# Patient Record
Sex: FEMALE | Race: WHITE | Employment: FULL TIME | URBAN - METROPOLITAN AREA
[De-identification: names, ages, dates, MRNs, and addresses within clinical notes are randomized per-mention and may not be internally consistent; named-entity substitution may affect disease eponyms.]

---

## 2017-01-04 ENCOUNTER — PATIENT MESSAGE (OUTPATIENT)
Dept: INTERNAL MEDICINE CLINIC | Facility: CLINIC | Age: 52
End: 2017-01-04

## 2017-01-05 ENCOUNTER — TELEPHONE (OUTPATIENT)
Dept: INTERNAL MEDICINE CLINIC | Facility: CLINIC | Age: 52
End: 2017-01-05

## 2017-01-05 DIAGNOSIS — E89.0 POSTOPERATIVE HYPOTHYROIDISM: ICD-10-CM

## 2017-01-05 DIAGNOSIS — E78.00 PURE HYPERCHOLESTEROLEMIA: Primary | ICD-10-CM

## 2017-01-05 RX ORDER — ATORVASTATIN CALCIUM 10 MG/1
TABLET, FILM COATED ORAL
Qty: 30 TABLET | Refills: 0 | Status: SHIPPED | OUTPATIENT
Start: 2017-01-05 | End: 2017-02-06

## 2017-01-06 ENCOUNTER — PATIENT MESSAGE (OUTPATIENT)
Dept: INTERNAL MEDICINE CLINIC | Facility: CLINIC | Age: 52
End: 2017-01-06

## 2017-01-06 NOTE — TELEPHONE ENCOUNTER
From: Fbai Goldberg  To: Katheryn Bunn MD  Sent: 1/4/2017 11:24 AM CST  Subject: Other    [id number]. Dhaval Bui Doctor, Can you please call in a prescription for my cold sore also I will be coming to see you in the month of Febru

## 2017-01-06 NOTE — TELEPHONE ENCOUNTER
Please call patient I refilled her cholesterol medicine for 1 month, she is due for blood test to check liver function test, and thyroid had to be rechecked, I will place order, if she wants to go somewhere else please mail he wonders if needed

## 2017-01-11 RX ORDER — DOXEPIN HYDROCHLORIDE 25 MG/1
CAPSULE ORAL
Qty: 30 CAPSULE | Refills: 5 | Status: CANCELLED | OUTPATIENT
Start: 2017-01-11

## 2017-01-11 NOTE — TELEPHONE ENCOUNTER
please advise as does not meet RN protocol for refill criteria- LOV 6/6/16  No appt pending          Refill Protocol Appointment Criteria  · Appointment scheduled in the past 6 months or in the next 3 months  Recent Visits       Provider Department [facility]

## 2017-01-11 NOTE — TELEPHONE ENCOUNTER
From: Sanna Sanders  To: Concha Vasquez MD  Sent: 1/7/2017 11:04 PM CST  Subject: Medication Renewal Request    Original authorizing provider: MD Sanna Ayala would like a refill of the following medications:  Doxepin HCl 25 MG Oral Cap [Nine

## 2017-01-12 RX ORDER — DOXEPIN HYDROCHLORIDE 25 MG/1
CAPSULE ORAL
Qty: 30 CAPSULE | Refills: 1 | OUTPATIENT
Start: 2017-01-12 | End: 2017-03-30

## 2017-01-16 ENCOUNTER — TELEPHONE (OUTPATIENT)
Dept: INTERNAL MEDICINE CLINIC | Facility: CLINIC | Age: 52
End: 2017-01-16

## 2017-01-16 DIAGNOSIS — Z00.00 PHYSICAL EXAM: Primary | ICD-10-CM

## 2017-01-16 DIAGNOSIS — E55.9 VITAMIN D DEFICIENCY: ICD-10-CM

## 2017-01-16 NOTE — TELEPHONE ENCOUNTER
Patient is coming in for her annual physical and would like to know if Dr Mahamed Covarrubias is interested in ordering any additional labs. She plans to have her fasting labs performed prior to this appt to discuss her results.  Call patient to discuss further so she k

## 2017-01-16 NOTE — TELEPHONE ENCOUNTER
rx for doxepin called in to pharmacy. Called pt. Left message to call back and make a f/u appointment.   [phone] (home)

## 2017-01-19 NOTE — TELEPHONE ENCOUNTER
Called pt. Left message requested test ordered by Dr. Irasema Hamilton . Pt. Can call back with questions.

## 2017-01-31 ENCOUNTER — OFFICE VISIT (OUTPATIENT)
Dept: INTERNAL MEDICINE CLINIC | Facility: CLINIC | Age: 52
End: 2017-01-31

## 2017-01-31 ENCOUNTER — LAB ENCOUNTER (OUTPATIENT)
Dept: LAB | Age: 52
End: 2017-01-31
Attending: INTERNAL MEDICINE
Payer: COMMERCIAL

## 2017-01-31 VITALS
TEMPERATURE: 98 F | HEART RATE: 70 BPM | HEIGHT: 66 IN | WEIGHT: 139 LBS | RESPIRATION RATE: 18 BRPM | SYSTOLIC BLOOD PRESSURE: 115 MMHG | DIASTOLIC BLOOD PRESSURE: 78 MMHG | BODY MASS INDEX: 22.34 KG/M2

## 2017-01-31 DIAGNOSIS — E55.9 VITAMIN D DEFICIENCY: ICD-10-CM

## 2017-01-31 DIAGNOSIS — Z12.39 BREAST CANCER SCREENING: ICD-10-CM

## 2017-01-31 DIAGNOSIS — F41.9 ANXIETY: ICD-10-CM

## 2017-01-31 DIAGNOSIS — E89.0 POSTOPERATIVE HYPOTHYROIDISM: ICD-10-CM

## 2017-01-31 DIAGNOSIS — R92.2 DENSE BREAST: ICD-10-CM

## 2017-01-31 DIAGNOSIS — E78.00 PURE HYPERCHOLESTEROLEMIA: ICD-10-CM

## 2017-01-31 DIAGNOSIS — Z00.00 PHYSICAL EXAM: ICD-10-CM

## 2017-01-31 DIAGNOSIS — Z00.00 PHYSICAL EXAM, ROUTINE: Primary | ICD-10-CM

## 2017-01-31 PROBLEM — R92.30 DENSE BREAST: Status: ACTIVE | Noted: 2017-01-31

## 2017-01-31 LAB
ALBUMIN SERPL BCP-MCNC: 4.1 G/DL (ref 3.5–4.8)
ALBUMIN/GLOB SERPL: 1.3 {RATIO} (ref 1–2)
ALP SERPL-CCNC: 56 U/L (ref 32–100)
ALT SERPL-CCNC: 22 U/L (ref 14–54)
ANION GAP SERPL CALC-SCNC: 7 MMOL/L (ref 0–18)
AST SERPL-CCNC: 24 U/L (ref 15–41)
BASOPHILS # BLD: 0.1 K/UL (ref 0–0.2)
BASOPHILS NFR BLD: 1 %
BILIRUB DIRECT SERPL-MCNC: 0.1 MG/DL (ref 0–0.2)
BILIRUB SERPL-MCNC: 0.6 MG/DL (ref 0.3–1.2)
BUN SERPL-MCNC: 12 MG/DL (ref 8–20)
BUN/CREAT SERPL: 18.8 (ref 10–20)
CALCIUM SERPL-MCNC: 9.4 MG/DL (ref 8.5–10.5)
CHLORIDE SERPL-SCNC: 102 MMOL/L (ref 95–110)
CHOLEST SERPL-MCNC: 166 MG/DL (ref 110–200)
CO2 SERPL-SCNC: 32 MMOL/L (ref 22–32)
CREAT SERPL-MCNC: 0.64 MG/DL (ref 0.5–1.5)
EOSINOPHIL # BLD: 0.2 K/UL (ref 0–0.7)
EOSINOPHIL NFR BLD: 4 %
ERYTHROCYTE [DISTWIDTH] IN BLOOD BY AUTOMATED COUNT: 13.4 % (ref 11–15)
GLOBULIN PLAS-MCNC: 3.2 G/DL (ref 2.5–3.7)
GLUCOSE SERPL-MCNC: 100 MG/DL (ref 70–99)
HCT VFR BLD AUTO: 37.7 % (ref 35–48)
HDLC SERPL-MCNC: 39 MG/DL
HGB BLD-MCNC: 12.2 G/DL (ref 12–16)
LDLC SERPL CALC-MCNC: 103 MG/DL (ref 0–99)
LYMPHOCYTES # BLD: 1.6 K/UL (ref 1–4)
LYMPHOCYTES NFR BLD: 31 %
MCH RBC QN AUTO: 26.6 PG (ref 27–32)
MCHC RBC AUTO-ENTMCNC: 32.5 G/DL (ref 32–37)
MCV RBC AUTO: 82 FL (ref 80–100)
MONOCYTES # BLD: 0.4 K/UL (ref 0–1)
MONOCYTES NFR BLD: 8 %
NEUTROPHILS # BLD AUTO: 2.8 K/UL (ref 1.8–7.7)
NEUTROPHILS NFR BLD: 55 %
NONHDLC SERPL-MCNC: 127 MG/DL
OSMOLALITY UR CALC.SUM OF ELEC: 292 MOSM/KG (ref 275–295)
PLATELET # BLD AUTO: 192 K/UL (ref 140–400)
PMV BLD AUTO: 9.6 FL (ref 7.4–10.3)
POTASSIUM SERPL-SCNC: 4.5 MMOL/L (ref 3.3–5.1)
PROT SERPL-MCNC: 7.3 G/DL (ref 5.9–8.4)
RBC # BLD AUTO: 4.59 M/UL (ref 3.7–5.4)
SODIUM SERPL-SCNC: 141 MMOL/L (ref 136–144)
TRIGL SERPL-MCNC: 118 MG/DL (ref 1–149)
TSH SERPL-ACNC: 0.4 UIU/ML (ref 0.34–5.6)
WBC # BLD AUTO: 5.1 K/UL (ref 4–11)

## 2017-01-31 PROCEDURE — 84443 ASSAY THYROID STIM HORMONE: CPT

## 2017-01-31 PROCEDURE — 99396 PREV VISIT EST AGE 40-64: CPT | Performed by: INTERNAL MEDICINE

## 2017-01-31 PROCEDURE — 36415 COLL VENOUS BLD VENIPUNCTURE: CPT

## 2017-01-31 PROCEDURE — 80053 COMPREHEN METABOLIC PANEL: CPT

## 2017-01-31 PROCEDURE — 85025 COMPLETE CBC W/AUTO DIFF WBC: CPT

## 2017-01-31 PROCEDURE — 80061 LIPID PANEL: CPT

## 2017-01-31 PROCEDURE — 82306 VITAMIN D 25 HYDROXY: CPT

## 2017-01-31 PROCEDURE — 82248 BILIRUBIN DIRECT: CPT

## 2017-01-31 NOTE — PROGRESS NOTES
HPI:    Patient ID: Sarmad Zuniga is a 45year old female presents for physical exam.    HPI  Patient reports that overall she has been doing well, continues to be bothered by menopausal symptoms getting 5 7 flashes throughout the day, mainly happened at nig 115/78 mmHg  Pulse 70  Temp(Src) 97.7 °F (36.5 °C) (Oral)  Resp 18  Ht 5' 6\" (1.676 m)  Wt 139 lb (63.05 kg)  BMI 22.45 kg/m2   Physical Exam    Constitutional: She is oriented to person, place, and time. She appears well-developed and well-nourished.  No hypothyroidism check TSH and adjust medication if needed    No orders of the defined types were placed in this encounter.        Meds This Visit:  No prescriptions requested or ordered in this encounter    Imaging & Referrals:  [facility] DAYDAY 2D+3D SCREENING BILA

## 2017-02-01 LAB — 25(OH)D3 SERPL-MCNC: 29.1 NG/ML

## 2017-02-06 RX ORDER — ATORVASTATIN CALCIUM 10 MG/1
TABLET, FILM COATED ORAL
Qty: 30 TABLET | Refills: 6 | Status: SHIPPED | OUTPATIENT
Start: 2017-02-06 | End: 2017-09-24

## 2017-03-06 RX ORDER — NADOLOL 20 MG/1
TABLET ORAL
Qty: 30 TABLET | Refills: 11 | Status: SHIPPED | OUTPATIENT
Start: 2017-03-06 | End: 2018-03-02

## 2017-03-06 RX ORDER — LEVOTHYROXINE SODIUM 0.12 MG/1
TABLET ORAL
Qty: 30 TABLET | Refills: 10 | Status: SHIPPED | OUTPATIENT
Start: 2017-03-06 | End: 2018-02-03

## 2017-03-17 ENCOUNTER — APPOINTMENT (OUTPATIENT)
Dept: GENERAL RADIOLOGY | Facility: HOSPITAL | Age: 52
End: 2017-03-17
Attending: STUDENT IN AN ORGANIZED HEALTH CARE EDUCATION/TRAINING PROGRAM
Payer: COMMERCIAL

## 2017-03-17 ENCOUNTER — HOSPITAL ENCOUNTER (EMERGENCY)
Facility: HOSPITAL | Age: 52
Discharge: HOME | End: 2017-03-17
Attending: STUDENT IN AN ORGANIZED HEALTH CARE EDUCATION/TRAINING PROGRAM
Payer: COMMERCIAL

## 2017-03-17 VITALS
BODY MASS INDEX: 22.5 KG/M2 | OXYGEN SATURATION: 100 % | RESPIRATION RATE: 15 BRPM | DIASTOLIC BLOOD PRESSURE: 82 MMHG | HEIGHT: 66 IN | WEIGHT: 140 LBS | HEART RATE: 68 BPM | SYSTOLIC BLOOD PRESSURE: 145 MMHG | TEMPERATURE: 99 F

## 2017-03-17 DIAGNOSIS — R07.9 ACUTE CHEST PAIN: Primary | ICD-10-CM

## 2017-03-17 LAB
ALBUMIN SERPL-MCNC: 4.4 G/DL (ref 3.5–4.8)
ALP LIVER SERPL-CCNC: 67 U/L (ref 41–108)
ALT SERPL-CCNC: 32 U/L (ref 14–54)
APTT PPP: 26.4 SECONDS (ref 25–34)
AST SERPL-CCNC: 27 U/L (ref 15–41)
BASOPHILS # BLD AUTO: 0.06 X10(3) UL (ref 0–0.1)
BASOPHILS NFR BLD AUTO: 0.9 %
BILIRUB SERPL-MCNC: 0.3 MG/DL (ref 0.1–2)
BUN BLD-MCNC: 16 MG/DL (ref 8–20)
CALCIUM BLD-MCNC: 9.7 MG/DL (ref 8.3–10.3)
CHLORIDE: 102 MMOL/L (ref 101–111)
CO2: 33 MMOL/L (ref 22–32)
CREAT BLD-MCNC: 0.77 MG/DL (ref 0.55–1.02)
EOSINOPHIL # BLD AUTO: 0.15 X10(3) UL (ref 0–0.3)
EOSINOPHIL NFR BLD AUTO: 2.2 %
ERYTHROCYTE [DISTWIDTH] IN BLOOD BY AUTOMATED COUNT: 13 % (ref 11.5–16)
GLUCOSE BLD-MCNC: 118 MG/DL (ref 70–99)
HCT VFR BLD AUTO: 37.9 % (ref 34–50)
HGB BLD-MCNC: 12.4 G/DL (ref 12–16)
IMMATURE GRANULOCYTE COUNT: 0.01 X10(3) UL (ref 0–1)
IMMATURE GRANULOCYTE RATIO %: 0.1 %
INR BLD: 0.95 (ref 0.89–1.11)
LYMPHOCYTES # BLD AUTO: 2.08 X10(3) UL (ref 0.9–4)
LYMPHOCYTES NFR BLD AUTO: 30.4 %
M PROTEIN MFR SERPL ELPH: 8.1 G/DL (ref 6.1–8.3)
MCH RBC QN AUTO: 27.2 PG (ref 27–33.2)
MCHC RBC AUTO-ENTMCNC: 32.7 G/DL (ref 31–37)
MCV RBC AUTO: 83.1 FL (ref 81–100)
MONOCYTES # BLD AUTO: 0.63 X10(3) UL (ref 0.1–0.6)
MONOCYTES NFR BLD AUTO: 9.2 %
NEUTROPHIL ABS PRELIM: 3.91 X10 (3) UL (ref 1.3–6.7)
NEUTROPHILS # BLD AUTO: 3.91 X10(3) UL (ref 1.3–6.7)
NEUTROPHILS NFR BLD AUTO: 57.2 %
PLATELET # BLD AUTO: 191 10(3)UL (ref 150–450)
POTASSIUM SERPL-SCNC: 3.9 MMOL/L (ref 3.6–5.1)
PRO-BETA NATRIURETIC PEPTIDE: 122 PG/ML (ref ?–125)
PSA SERPL DL<=0.01 NG/ML-MCNC: 12.7 SECONDS (ref 12–14.3)
RBC # BLD AUTO: 4.56 X10(6)UL (ref 3.8–5.1)
RED CELL DISTRIBUTION WIDTH-SD: 39.2 FL (ref 35.1–46.3)
SODIUM SERPL-SCNC: 139 MMOL/L (ref 136–144)
TROPONIN: <0.046 NG/ML (ref ?–0.05)
WBC # BLD AUTO: 6.8 X10(3) UL (ref 4–13)

## 2017-03-17 PROCEDURE — 99284 EMERGENCY DEPT VISIT MOD MDM: CPT

## 2017-03-17 PROCEDURE — 93010 ELECTROCARDIOGRAM REPORT: CPT

## 2017-03-17 PROCEDURE — 71010 XR CHEST AP PORTABLE  (CPT=71010): CPT

## 2017-03-17 PROCEDURE — 85610 PROTHROMBIN TIME: CPT | Performed by: STUDENT IN AN ORGANIZED HEALTH CARE EDUCATION/TRAINING PROGRAM

## 2017-03-17 PROCEDURE — 99285 EMERGENCY DEPT VISIT HI MDM: CPT

## 2017-03-17 PROCEDURE — 85730 THROMBOPLASTIN TIME PARTIAL: CPT | Performed by: STUDENT IN AN ORGANIZED HEALTH CARE EDUCATION/TRAINING PROGRAM

## 2017-03-17 PROCEDURE — 93005 ELECTROCARDIOGRAM TRACING: CPT

## 2017-03-17 PROCEDURE — 96360 HYDRATION IV INFUSION INIT: CPT

## 2017-03-17 PROCEDURE — 85025 COMPLETE CBC W/AUTO DIFF WBC: CPT | Performed by: STUDENT IN AN ORGANIZED HEALTH CARE EDUCATION/TRAINING PROGRAM

## 2017-03-17 PROCEDURE — 83880 ASSAY OF NATRIURETIC PEPTIDE: CPT | Performed by: STUDENT IN AN ORGANIZED HEALTH CARE EDUCATION/TRAINING PROGRAM

## 2017-03-17 PROCEDURE — 84484 ASSAY OF TROPONIN QUANT: CPT | Performed by: STUDENT IN AN ORGANIZED HEALTH CARE EDUCATION/TRAINING PROGRAM

## 2017-03-17 PROCEDURE — 80053 COMPREHEN METABOLIC PANEL: CPT | Performed by: STUDENT IN AN ORGANIZED HEALTH CARE EDUCATION/TRAINING PROGRAM

## 2017-03-17 RX ORDER — ASPIRIN 81 MG/1
324 TABLET, CHEWABLE ORAL ONCE
Status: COMPLETED | OUTPATIENT
Start: 2017-03-17 | End: 2017-03-17

## 2017-03-18 ENCOUNTER — TELEPHONE (OUTPATIENT)
Dept: INTERNAL MEDICINE CLINIC | Facility: CLINIC | Age: 52
End: 2017-03-18

## 2017-03-18 ENCOUNTER — APPOINTMENT (OUTPATIENT)
Dept: CV DIAGNOSTICS | Facility: HOSPITAL | Age: 52
End: 2017-03-18
Attending: EMERGENCY MEDICINE
Payer: COMMERCIAL

## 2017-03-18 ENCOUNTER — HOSPITAL ENCOUNTER (EMERGENCY)
Facility: HOSPITAL | Age: 52
Discharge: HOME | End: 2017-03-18
Attending: EMERGENCY MEDICINE
Payer: COMMERCIAL

## 2017-03-18 VITALS
RESPIRATION RATE: 18 BRPM | WEIGHT: 140 LBS | BODY MASS INDEX: 22.5 KG/M2 | SYSTOLIC BLOOD PRESSURE: 130 MMHG | OXYGEN SATURATION: 100 % | DIASTOLIC BLOOD PRESSURE: 85 MMHG | HEART RATE: 66 BPM | TEMPERATURE: 98 F | HEIGHT: 66 IN

## 2017-03-18 DIAGNOSIS — R07.89 CHEST PAIN, ATYPICAL: Primary | ICD-10-CM

## 2017-03-18 LAB
ATRIAL RATE: 65 BPM
ATRIAL RATE: 68 BPM
P AXIS: 73 DEGREES
P AXIS: 74 DEGREES
P-R INTERVAL: 166 MS
P-R INTERVAL: 194 MS
Q-T INTERVAL: 390 MS
Q-T INTERVAL: 394 MS
QRS DURATION: 72 MS
QRS DURATION: 74 MS
QTC CALCULATION (BEZET): 405 MS
QTC CALCULATION (BEZET): 418 MS
R AXIS: 38 DEGREES
R AXIS: 41 DEGREES
T AXIS: 58 DEGREES
T AXIS: 59 DEGREES
TROPONIN: <0.046 NG/ML (ref ?–0.05)
VENTRICULAR RATE: 65 BPM
VENTRICULAR RATE: 68 BPM

## 2017-03-18 PROCEDURE — 93018 CV STRESS TEST I&R ONLY: CPT | Performed by: INTERNAL MEDICINE

## 2017-03-18 PROCEDURE — 96374 THER/PROPH/DIAG INJ IV PUSH: CPT

## 2017-03-18 PROCEDURE — 93350 STRESS TTE ONLY: CPT

## 2017-03-18 PROCEDURE — 93017 CV STRESS TEST TRACING ONLY: CPT

## 2017-03-18 PROCEDURE — 93350 STRESS TTE ONLY: CPT | Performed by: INTERNAL MEDICINE

## 2017-03-18 PROCEDURE — 84484 ASSAY OF TROPONIN QUANT: CPT | Performed by: EMERGENCY MEDICINE

## 2017-03-18 PROCEDURE — 99285 EMERGENCY DEPT VISIT HI MDM: CPT

## 2017-03-18 PROCEDURE — 93010 ELECTROCARDIOGRAM REPORT: CPT

## 2017-03-18 PROCEDURE — 93005 ELECTROCARDIOGRAM TRACING: CPT

## 2017-03-18 RX ORDER — KETOROLAC TROMETHAMINE 30 MG/ML
30 INJECTION, SOLUTION INTRAMUSCULAR; INTRAVENOUS ONCE
Status: COMPLETED | OUTPATIENT
Start: 2017-03-18 | End: 2017-03-18

## 2017-03-18 NOTE — ED PROVIDER NOTES
Signed out by Dr. Nikki Sepulveda to f/u stress echo result. Dr. Calista Galindo did call and relay stress echo appears negative. Pt remains aysmptomatic. Will discharge home as planned.

## 2017-03-18 NOTE — ED INITIAL ASSESSMENT (HPI)
Arrives with c/o midsternal chest pain that began at 1330 today while at work. States thought it was heartburn. Eventually took two antacids without relief.   When was punching out of work at [employer], was checked by the nurse who took her blood press

## 2017-03-18 NOTE — ED INITIAL ASSESSMENT (HPI)
Pt c/o intermittent chest pain that started yesterday at 1330, Pt was seen in ER last night for evaluation of chest pain, Pt rpt feeling improvement last night and was D/C home.  However, Pt awoke this AM w/ return of intermittent chest pain that comes on f

## 2017-03-18 NOTE — ED PROVIDER NOTES
Patient Seen in: [facility] Emergency Department    History   Patient presents with:  Chest Pain Angina (cardiovascular)    Stated Complaint: Chest Pain    HPI    Patient is a 79-year-old woman presents for reevaluation.   Patient had intermittent ch stated in HPI.     Physical Exam       ED Triage Vitals   BP 03/18/17 1152 162/98 mmHg   Pulse 03/18/17 1152 68   Resp 03/18/17 1152 18   Temp 03/18/17 1152 98.3 °F (36.8 °C)   Temp src 03/18/17 1152 Temporal   SpO2 03/18/17 1152 100 %   O2 Device 03/18/17 file for this visit. Follow-up:  No follow-up provider specified.     Medications Prescribed:  Current Discharge Medication List

## 2017-03-18 NOTE — ED NOTES
PT return from Stress Test in no acute distress, A&Ox4, skin parameters in tact, breathing w/o difficulty, NSR on cardiac monitor, denies pain,  at bedside

## 2017-03-18 NOTE — TELEPHONE ENCOUNTER
Pt called in stating she went to the ER yesterday for chest pains and was told to make a follow up appt with Dr. Rubén Teresa so she can get a stress test ordered. CSS was going to book the appt for an ER follow up, but pt states her chest is still sore today.

## 2017-03-18 NOTE — TELEPHONE ENCOUNTER
Actions Requested: Requesting order for stress test and calling as directed to do so per ER MD yesterday  Situation/Background   Problem: Chest pain   Onset: yesterday   Associated Symptoms: chest pain worsens with exertion/movement, denies any sob, nausea emergency department work-up was negative. Patient remained hemodynamically stable throughout their emergency department period of observation with no adverse events or symptoms reported by the patient.     Patient was given aspirin empirically in the e

## 2017-03-18 NOTE — ED PROVIDER NOTES
Patient Seen in: [facility] Emergency Department    History   Patient presents with:  Chest Pain Angina (cardiovascular)    Stated Complaint: chest pain    HPI    Patient is a 53-year-old female who presents emergency department complaining of centr 62         Smoking Status: Former Smoker                   Packs/Day: 0.00  Years:         Alcohol Use: No                Review of Systems    Positive for stated complaint: chest pain  Other systems are as noted in HPI.   Constitutional and vital signs rev 0.63 (*)     All other components within normal limits   TROPONIN I - Normal   PRO BETA NATRIURETIC PEPTIDE - Normal   PROTHROMBIN TIME (PT) - Normal   PTT, ACTIVATED - Normal    Narrative:      The aPTT Heparin Therapeutic Range is approximately 65- 104 se outpatient workup at this time and the patient prefers to follow-up on outpatient basis.     Given the fact that her symptoms do not coincide with exertion, the patient has no previous history of high cholesterol, high blood pressure or diabetes or smoking

## 2017-03-18 NOTE — TELEPHONE ENCOUNTER
Kelly Sandoval @[employer] 9:47 AM      Please see acute for Tanisha De Paz, they are calling back. thank you.

## 2017-03-20 NOTE — TELEPHONE ENCOUNTER
Patient was seen in the emergency room and had negative stress echocardiogram, she should see cardiologist if she continues to have chest pains, she is high risk because of her history taking cholesterol medication, blood pressure medication etc. please re

## 2017-03-30 RX ORDER — DOXEPIN HYDROCHLORIDE 25 MG/1
CAPSULE ORAL
Qty: 30 CAPSULE | Refills: 2 | Status: SHIPPED | OUTPATIENT
Start: 2017-03-30 | End: 2017-06-14

## 2017-03-30 NOTE — TELEPHONE ENCOUNTER
Pt calling to refill doxepin 25, she is out medication. Please advise. Current Outpatient Prescriptions:  Doxepin HCl 25 MG Oral Cap TAKE 1 CAPSULE BY MOUTH NIGHTLY.  Disp: 30 capsule Rfl: 1

## 2017-03-30 NOTE — TELEPHONE ENCOUNTER
Rx request for Doxepin HCL 25 mg, PASSED Psychiatric-Non-Scheduled (anti-anxiety) Protocol. Rx filled per protocol.     Refill Protocol Appointment Criteria  · Appointment scheduled in the past 6 months or in the next 3 months  Recent Visits       Provider

## 2017-04-05 ENCOUNTER — HOSPITAL ENCOUNTER (OUTPATIENT)
Dept: MAMMOGRAPHY | Age: 52
Discharge: HOME | End: 2017-04-05
Attending: INTERNAL MEDICINE
Payer: COMMERCIAL

## 2017-04-05 DIAGNOSIS — R92.2 DENSE BREAST: ICD-10-CM

## 2017-04-05 DIAGNOSIS — Z12.39 BREAST CANCER SCREENING: ICD-10-CM

## 2017-04-05 PROCEDURE — 77063 BREAST TOMOSYNTHESIS BI: CPT

## 2017-04-05 PROCEDURE — 77067 SCR MAMMO BI INCL CAD: CPT

## 2017-04-05 RX ORDER — ESCITALOPRAM OXALATE 10 MG/1
TABLET ORAL
Qty: 30 TABLET | Refills: 6 | Status: SHIPPED | OUTPATIENT
Start: 2017-04-05 | End: 2017-10-31

## 2017-06-14 RX ORDER — DOXEPIN HYDROCHLORIDE 25 MG/1
CAPSULE ORAL
Qty: 30 CAPSULE | Refills: 5 | Status: SHIPPED | OUTPATIENT
Start: 2017-06-14 | End: 2017-08-02

## 2017-06-14 NOTE — TELEPHONE ENCOUNTER
Pt is going out of town today and will need her medication. Didn't realize she was down to her last 2 pills until the last minute.

## 2017-06-14 NOTE — TELEPHONE ENCOUNTER
Please advise on refill request.     Refill Protocol Appointment Criteria  · Appointment scheduled in the past 6 months or in the next 3 months  Recent Visits       Provider Department Primary Dx    4 months ago Elodia Rosenbaum MD [facility], [facility], [address] [place] [address]

## 2017-08-01 ENCOUNTER — TELEPHONE (OUTPATIENT)
Dept: INTERNAL MEDICINE CLINIC | Facility: CLINIC | Age: 52
End: 2017-08-01

## 2017-08-01 NOTE — TELEPHONE ENCOUNTER
Current Outpatient Prescriptions:  DOXEPIN HCL 25 MG Oral Cap TAKE 1 CAPSULE BY MOUTH NIGHTLY.  Disp: 30 capsule Rfl: 5     Pt requesting refill- said she has been taking 2 daily-per Dr Claudio Serum instructions if needed to help her sleep  Asking for new rx

## 2017-08-02 ENCOUNTER — TELEPHONE (OUTPATIENT)
Dept: INTERNAL MEDICINE CLINIC | Facility: CLINIC | Age: 52
End: 2017-08-02

## 2017-08-02 DIAGNOSIS — E78.00 PURE HYPERCHOLESTEROLEMIA: ICD-10-CM

## 2017-08-02 DIAGNOSIS — E89.0 POSTOPERATIVE HYPOTHYROIDISM: Primary | ICD-10-CM

## 2017-08-02 NOTE — TELEPHONE ENCOUNTER
Patient states that she is running out of Rx sooner than intended due to Dr giving her permission to take 2 caps to help her sleep instead of just 1 daily.  Needs us to send revised script to pharmacy with adjusted dose and quantity which would allow her fl

## 2017-08-02 NOTE — TELEPHONE ENCOUNTER
Dr Debi Potts, please see pt message below. Nothing noted (updated) in med list that pt is to take 2 tabs instead of the one nightly. Please advise regarding pended refill with 2 capsule nightly dose.

## 2017-08-03 RX ORDER — DOXEPIN HYDROCHLORIDE 25 MG/1
50 CAPSULE ORAL NIGHTLY
Qty: 60 CAPSULE | Refills: 3 | Status: SHIPPED | OUTPATIENT
Start: 2017-08-03 | End: 2017-09-24

## 2017-08-15 ENCOUNTER — TELEPHONE (OUTPATIENT)
Dept: FAMILY MEDICINE CLINIC | Facility: CLINIC | Age: 52
End: 2017-08-15

## 2017-08-15 NOTE — TELEPHONE ENCOUNTER
Actions Requested: pt asking for prescription for antibiotic, pt already has appt scheduled Monday 8/21/17.   Problem: swelling under right eye near nose  Onset and Timing: symptoms started one week ago  Associated Symptoms: problem started when pt noted a rash with multiple small blisters grouped together (i.e., dermatomal distribution or \"band\" or \"stripe\")  * Swelling of both lower legs (i.e., bilateral pedal edema)  * Widespread rash on body  * Swelling is painful to touch  * Toothache  * Patient wan

## 2017-08-15 NOTE — TELEPHONE ENCOUNTER
Pt needs to be seen  , any doctor at the clinic is fine, she needs to be  evaluated , IC is option as well

## 2017-08-16 NOTE — TELEPHONE ENCOUNTER
Patient has choice to see eye specialist, I can see her tomorrow at 11:00, at [facility]   double book 11;40 if needed, my Friday schedule is open for her to be seen if you appointment to speed up evaluation

## 2017-08-16 NOTE — TELEPHONE ENCOUNTER
Patient stated she does not have the money to go to the IC and does not have the time to get off work,,   She stated she has a \"small knot\" and swelling in the corner of her right eye. She also stated \"it is more of a cosmetic thing. \"    Was asking fo

## 2017-08-17 NOTE — TELEPHONE ENCOUNTER
Pt states she shceduled an appt for Monday 8/21 which is the first opportunity she has to see the doctor. States she had a pimple beneath her eye which she squeezed and now it is red and inflamed.   States she has gotten abx in the past for skin and acne i

## 2017-08-17 NOTE — TELEPHONE ENCOUNTER
Spoke with patient who states that this is the situation, she states we misunderstood what she was informing us of. Under her eye on the side of her nose she had a white head that she squeezed.  She states it began to go down after a few days but she co

## 2017-08-17 NOTE — TELEPHONE ENCOUNTER
Pt  Needs to be seen any doctor at the clinic or  IC is  impotant I   Do not feel comfortable treating her bu phone , face  Cellulitis  Can be serious, she needs to be seen

## 2017-08-21 ENCOUNTER — LAB ENCOUNTER (OUTPATIENT)
Dept: LAB | Age: 52
End: 2017-08-21
Attending: INTERNAL MEDICINE
Payer: COMMERCIAL

## 2017-08-21 ENCOUNTER — OFFICE VISIT (OUTPATIENT)
Dept: INTERNAL MEDICINE CLINIC | Facility: CLINIC | Age: 52
End: 2017-08-21

## 2017-08-21 ENCOUNTER — OFFICE VISIT (OUTPATIENT)
Dept: SURGERY | Facility: CLINIC | Age: 52
End: 2017-08-21

## 2017-08-21 VITALS
BODY MASS INDEX: 23 KG/M2 | HEART RATE: 69 BPM | SYSTOLIC BLOOD PRESSURE: 122 MMHG | DIASTOLIC BLOOD PRESSURE: 78 MMHG | WEIGHT: 141 LBS

## 2017-08-21 DIAGNOSIS — I10 ESSENTIAL HYPERTENSION: ICD-10-CM

## 2017-08-21 DIAGNOSIS — E89.0 POSTSURGICAL HYPOTHYROIDISM: Primary | ICD-10-CM

## 2017-08-21 DIAGNOSIS — L72.0 EPIDERMAL CYST: Primary | ICD-10-CM

## 2017-08-21 DIAGNOSIS — E78.00 PURE HYPERCHOLESTEROLEMIA: ICD-10-CM

## 2017-08-21 DIAGNOSIS — F41.9 ANXIETY: ICD-10-CM

## 2017-08-21 DIAGNOSIS — L08.9 INFECTION, FACE: ICD-10-CM

## 2017-08-21 DIAGNOSIS — E89.0 POSTOPERATIVE HYPOTHYROIDISM: ICD-10-CM

## 2017-08-21 LAB
ALBUMIN SERPL BCP-MCNC: 4.1 G/DL (ref 3.5–4.8)
ALBUMIN/GLOB SERPL: 1.3 {RATIO} (ref 1–2)
ALP SERPL-CCNC: 57 U/L (ref 32–100)
ALT SERPL-CCNC: 25 U/L (ref 14–54)
ANION GAP SERPL CALC-SCNC: 8 MMOL/L (ref 0–18)
AST SERPL-CCNC: 27 U/L (ref 15–41)
BASOPHILS # BLD: 0.1 K/UL (ref 0–0.2)
BASOPHILS NFR BLD: 1 %
BILIRUB SERPL-MCNC: 0.4 MG/DL (ref 0.3–1.2)
BUN SERPL-MCNC: 11 MG/DL (ref 8–20)
BUN/CREAT SERPL: 16.2 (ref 10–20)
CALCIUM SERPL-MCNC: 9.3 MG/DL (ref 8.5–10.5)
CHLORIDE SERPL-SCNC: 100 MMOL/L (ref 95–110)
CO2 SERPL-SCNC: 30 MMOL/L (ref 22–32)
CREAT SERPL-MCNC: 0.68 MG/DL (ref 0.5–1.5)
EOSINOPHIL # BLD: 0.3 K/UL (ref 0–0.7)
EOSINOPHIL NFR BLD: 5 %
ERYTHROCYTE [DISTWIDTH] IN BLOOD BY AUTOMATED COUNT: 13.6 % (ref 11–15)
GLOBULIN PLAS-MCNC: 3.1 G/DL (ref 2.5–3.7)
GLUCOSE SERPL-MCNC: 97 MG/DL (ref 70–99)
HCT VFR BLD AUTO: 36.8 % (ref 35–48)
HGB BLD-MCNC: 12 G/DL (ref 12–16)
LYMPHOCYTES # BLD: 1.6 K/UL (ref 1–4)
LYMPHOCYTES NFR BLD: 29 %
MCH RBC QN AUTO: 26.4 PG (ref 27–32)
MCHC RBC AUTO-ENTMCNC: 32.7 G/DL (ref 32–37)
MCV RBC AUTO: 80.7 FL (ref 80–100)
MONOCYTES # BLD: 0.6 K/UL (ref 0–1)
MONOCYTES NFR BLD: 10 %
NEUTROPHILS # BLD AUTO: 3 K/UL (ref 1.8–7.7)
NEUTROPHILS NFR BLD: 55 %
OSMOLALITY UR CALC.SUM OF ELEC: 285 MOSM/KG (ref 275–295)
PLATELET # BLD AUTO: 190 K/UL (ref 140–400)
PMV BLD AUTO: 9.1 FL (ref 7.4–10.3)
POTASSIUM SERPL-SCNC: 4.6 MMOL/L (ref 3.3–5.1)
PROT SERPL-MCNC: 7.2 G/DL (ref 5.9–8.4)
RBC # BLD AUTO: 4.56 M/UL (ref 3.7–5.4)
SODIUM SERPL-SCNC: 138 MMOL/L (ref 136–144)
TSH SERPL-ACNC: 1.13 UIU/ML (ref 0.45–5.33)
WBC # BLD AUTO: 5.5 K/UL (ref 4–11)

## 2017-08-21 PROCEDURE — 99214 OFFICE O/P EST MOD 30 MIN: CPT | Performed by: INTERNAL MEDICINE

## 2017-08-21 PROCEDURE — 85025 COMPLETE CBC W/AUTO DIFF WBC: CPT

## 2017-08-21 PROCEDURE — 99212 OFFICE O/P EST SF 10 MIN: CPT | Performed by: PLASTIC SURGERY

## 2017-08-21 PROCEDURE — 36415 COLL VENOUS BLD VENIPUNCTURE: CPT

## 2017-08-21 PROCEDURE — 80053 COMPREHEN METABOLIC PANEL: CPT

## 2017-08-21 PROCEDURE — 84443 ASSAY THYROID STIM HORMONE: CPT

## 2017-08-21 PROCEDURE — 99212 OFFICE O/P EST SF 10 MIN: CPT | Performed by: INTERNAL MEDICINE

## 2017-08-21 PROCEDURE — 99243 OFF/OP CNSLTJ NEW/EST LOW 30: CPT | Performed by: PLASTIC SURGERY

## 2017-08-21 RX ORDER — SULFAMETHOXAZOLE AND TRIMETHOPRIM 800; 160 MG/1; MG/1
1 TABLET ORAL 2 TIMES DAILY
Qty: 14 TABLET | Refills: 0 | Status: SHIPPED | OUTPATIENT
Start: 2017-08-21 | End: 2017-08-28

## 2017-08-21 RX ORDER — CEFADROXIL 500 MG/1
500 CAPSULE ORAL 2 TIMES DAILY
Qty: 14 CAPSULE | Refills: 0 | Status: SHIPPED | OUTPATIENT
Start: 2017-08-21 | End: 2017-08-28

## 2017-08-21 NOTE — H&P
Diaz Bingham is a 47year old female that presents with Patient presents with:  Cyst: R Nose  .     REFERRED BY:  Zacarias Bolton    Pacemaker: No  Latex Allergy: no  Coumadin: No  Plavix: No  Other anticoagulants: No  Cardiac stents: No    HAND DOMINANCE:  Rig Date   • Anxiety    • Cancer [facility])    • Disorder of thyroid    • Thyroid cancer ([facility] [address]    • Thyroid disease      Past Surgical History:  No date: THYROID ABLATION, CARCINOMA  No date: TONSILLECTOMY    Social History  Social History   Marital status:  purulence. Duricef  Bactrim DS  Warm soaks    Discharged. To call if any problems or concerns.             8/21/2017  Natalya Adamson MD

## 2017-08-21 NOTE — PROGRESS NOTES
HPI:    Patient ID: Gladys Mcgrath is a 47year old female presents for evaluation of multiple concerns and follow-up of multiple medical conditions.     HPI  Last week she developed small pimple-like lesion by the right eye, she squeezed it and pimple went do NIGHTLY.  Disp: 30 tablet Rfl: 6     Allergies:No Known Allergies   /78 (BP Location: Right arm, Patient Position: Sitting, Cuff Size: adult)   Pulse 69   Wt 141 lb (64 kg)   BMI 22.76 kg/m²    Physical Exam    Constitutional: She is oriented to Personal Estate Manager encounter    Imaging & Referrals:  None         ID#[id number]

## 2017-08-21 NOTE — PROGRESS NOTES
After pt evaluated by Dr. Noemi Carter verbal orders to apply band-aid. Band-aid applied and pt instructed to call the office w/any further questions and/or concerns.

## 2017-08-28 ENCOUNTER — TELEPHONE (OUTPATIENT)
Dept: OTHER | Age: 52
End: 2017-08-28

## 2017-08-28 NOTE — TELEPHONE ENCOUNTER
Pharmacy calling stating they did not receive Doxepin script, prescription provided verbally to pharmacist Tito Argueta.

## 2017-08-29 ENCOUNTER — TELEPHONE (OUTPATIENT)
Dept: OTHER | Age: 52
End: 2017-08-29

## 2017-08-29 ENCOUNTER — TELEPHONE (OUTPATIENT)
Dept: INTERNAL MEDICINE CLINIC | Facility: CLINIC | Age: 52
End: 2017-08-29

## 2017-08-29 DIAGNOSIS — E03.9 HYPOTHYROIDISM, UNSPECIFIED TYPE: Primary | ICD-10-CM

## 2017-08-29 NOTE — TELEPHONE ENCOUNTER
Spoke to patient, she has been taking levothyroxine 125 mcg 6 days a week, advised her to increase to 7 days a week and recheck TSH in 2 3 months, order placed, prescription faxed

## 2017-08-29 NOTE — TELEPHONE ENCOUNTER
Pt states Dr. Myranda Lopez called her about test results last weekend and advised her to call the office to get a new prescription for her thyroid medication. Pt is currently taking Levothyroxine 125 mcg daily. Please advise.

## 2017-09-24 RX ORDER — ATORVASTATIN CALCIUM 10 MG/1
TABLET, FILM COATED ORAL
Qty: 30 TABLET | Refills: 5 | Status: SHIPPED | OUTPATIENT
Start: 2017-09-24 | End: 2018-05-21

## 2017-09-24 RX ORDER — DOXEPIN HYDROCHLORIDE 25 MG/1
CAPSULE ORAL
Qty: 60 CAPSULE | Refills: 5 | Status: SHIPPED | OUTPATIENT
Start: 2017-09-24 | End: 2018-02-01

## 2017-10-31 RX ORDER — ESCITALOPRAM OXALATE 10 MG/1
TABLET ORAL
Qty: 30 TABLET | Refills: 5 | Status: SHIPPED | OUTPATIENT
Start: 2017-10-31 | End: 2018-02-03

## 2017-11-05 ENCOUNTER — PATIENT MESSAGE (OUTPATIENT)
Dept: INTERNAL MEDICINE CLINIC | Facility: CLINIC | Age: 52
End: 2017-11-05

## 2017-11-06 ENCOUNTER — NURSE TRIAGE (OUTPATIENT)
Dept: OTHER | Age: 52
End: 2017-11-06

## 2017-11-06 NOTE — TELEPHONE ENCOUNTER
LMTCB, see my chart message below.                                       Marvel Ladd To Sent  11/5/2017  9:51 AM   [facility] Doctor Hallie Crane have a cold and a raspy chest cough   Can you please prescribe an antibiotic for my cold and cough because my n

## 2017-11-06 NOTE — TELEPHONE ENCOUNTER
From: Tasneem Carroll  To: Jeffrey Pineda MD  Sent: 11/5/2017 9:51 AM CST  Subject: Other    [facility] Doctor Denita Lazaro,      I have a cold and a raspy chest cough  Can you please prescribe an antibiotic for my cold and cough because my next day off is Thursday at wo

## 2017-11-07 RX ORDER — AZITHROMYCIN 250 MG/1
TABLET, FILM COATED ORAL
Qty: 6 TABLET | Refills: 0 | Status: SHIPPED | OUTPATIENT
Start: 2017-11-07 | End: 2017-11-11

## 2017-11-07 NOTE — TELEPHONE ENCOUNTER
Spoke to pt,  Advised to star  z-pack  And if not better  And not able to work needs to ve seen for evalaution  And have  Note for work

## 2017-11-07 NOTE — TELEPHONE ENCOUNTER
Action Requested: Summary for Provider     []  Critical Lab, Recommendations Needed  [x] Need Additional Advice  []   FYI    []   Need Orders  [] Need Medications Sent to Pharmacy  []  Other     SUMMARY: pt reports since last week productive cough with gre

## 2017-11-08 ENCOUNTER — TELEPHONE (OUTPATIENT)
Dept: OTHER | Age: 52
End: 2017-11-08

## 2017-11-08 ENCOUNTER — TELEPHONE (OUTPATIENT)
Dept: INTERNAL MEDICINE CLINIC | Facility: CLINIC | Age: 52
End: 2017-11-08

## 2017-11-08 ENCOUNTER — OFFICE VISIT (OUTPATIENT)
Dept: INTERNAL MEDICINE CLINIC | Facility: CLINIC | Age: 52
End: 2017-11-08

## 2017-11-08 VITALS
DIASTOLIC BLOOD PRESSURE: 73 MMHG | TEMPERATURE: 98 F | HEART RATE: 64 BPM | HEIGHT: 66 IN | WEIGHT: 142 LBS | SYSTOLIC BLOOD PRESSURE: 118 MMHG | BODY MASS INDEX: 22.82 KG/M2 | RESPIRATION RATE: 18 BRPM

## 2017-11-08 DIAGNOSIS — J40 TRACHEOBRONCHITIS: Primary | ICD-10-CM

## 2017-11-08 PROCEDURE — 99212 OFFICE O/P EST SF 10 MIN: CPT | Performed by: INTERNAL MEDICINE

## 2017-11-08 PROCEDURE — 99214 OFFICE O/P EST MOD 30 MIN: CPT | Performed by: INTERNAL MEDICINE

## 2017-11-08 RX ORDER — VALACYCLOVIR HYDROCHLORIDE 1 G/1
TABLET, FILM COATED ORAL
Qty: 4 TABLET | Refills: 3 | Status: SHIPPED | OUTPATIENT
Start: 2017-11-08 | End: 2018-04-30

## 2017-11-08 RX ORDER — ALBUTEROL SULFATE 90 UG/1
2 AEROSOL, METERED RESPIRATORY (INHALATION) EVERY 6 HOURS PRN
Qty: 1 INHALER | Refills: 0 | Status: SHIPPED | OUTPATIENT
Start: 2017-11-08 | End: 2018-04-30

## 2017-11-08 NOTE — TELEPHONE ENCOUNTER
Pt requesting work note to return to return to work on Friday 11/10  Note needs to state has chest cold    Said Dr Jono Yeager aware of her symptoms      P/u LOM- this morning if possible

## 2017-11-08 NOTE — PROGRESS NOTES
HPI:    Patient ID: Danielle Parkinson is a 47year old female.   Presents for evaluation of the cough, generalized fatigue    HPI  Patient reports that she developed cough and chest congestion 5 days ago, had low-grade fever, headache, postnasal drainage, cough w MOUTH ONCE DAILY.  Disp: 30 tablet Rfl: 5     Allergies:No Known Allergies   /73 (BP Location: Right arm, Patient Position: Sitting, Cuff Size: adult)   Pulse 64   Temp 98 °F (36.7 °C) (Oral)   Resp 18   Ht 5' 6\" (1.676 m)   Wt 142 lb (64.4 kg)   BMI

## 2018-02-03 NOTE — TELEPHONE ENCOUNTER
From: Miya Howard  Sent: 2/3/2018 9:11 AM CST  Subject: Medication Renewal Request    Adriana Vela would like a refill of the following medications:     Doxepin HCl 10 MG Oral Cap Alcon Sabillon MD]    Preferred pharmacy: [facility] [facility] [phone], [phone]    Comment:

## 2018-02-03 NOTE — TELEPHONE ENCOUNTER
From: Moiz Luna  Sent: 2/3/2018 9:14 AM CST  Subject: Medication Renewal Request    Adriana Vela would like a refill of the following medications:     LEVOTHYROXINE SODIUM 125 MCG Oral Tab Dori Coyle MD]    Preferred pharmacy: [address] [facility]

## 2018-02-03 NOTE — TELEPHONE ENCOUNTER
From: Isabel Lopez  Sent: 2/3/2018 9:14 AM CST  Subject: Medication Renewal Request    Adriana Vela would like a refill of the following medications:     ESCITALOPRAM 10 MG Oral Tab Cristino Rasheed MD]    Preferred pharmacy: [facility] [phone], [phone]    Comment:

## 2018-02-04 RX ORDER — LEVOTHYROXINE SODIUM 0.12 MG/1
125 TABLET ORAL
Qty: 30 TABLET | Refills: 2 | Status: SHIPPED | OUTPATIENT
Start: 2018-02-04 | End: 2018-04-26

## 2018-02-04 NOTE — TELEPHONE ENCOUNTER
Refilled per protocol.   Hypothyroid Medications  Protocol Criteria:  Appointment scheduled in the past 12 months or the next 3 months  TSH resulted in the past 12 months that is normal  Recent Outpatient Visits            2 months ago Tracheobronchitis

## 2018-02-05 RX ORDER — DOXEPIN HYDROCHLORIDE 10 MG/1
10 CAPSULE ORAL NIGHTLY
Qty: 90 CAPSULE | Refills: 0 | OUTPATIENT
Start: 2018-02-05

## 2018-02-06 RX ORDER — ESCITALOPRAM OXALATE 10 MG/1
10 TABLET ORAL
Qty: 30 TABLET | Refills: 5 | Status: SHIPPED | OUTPATIENT
Start: 2018-02-06 | End: 2018-10-27

## 2018-03-02 RX ORDER — NADOLOL 20 MG/1
TABLET ORAL
Qty: 30 TABLET | Refills: 10 | Status: SHIPPED | OUTPATIENT
Start: 2018-03-02 | End: 2019-01-30

## 2018-03-19 ENCOUNTER — TELEPHONE (OUTPATIENT)
Dept: INTERNAL MEDICINE CLINIC | Facility: CLINIC | Age: 53
End: 2018-03-19

## 2018-03-19 NOTE — TELEPHONE ENCOUNTER
Pt made an appt to see Dr. Anai Guerra for 4-30-18 for a physical and would like to know if doctor can give her an order to get blood work done prior to her appointment. Please, call pt when the order is in the system.

## 2018-03-19 NOTE — TELEPHONE ENCOUNTER
Please advise to the communication, last lab dated 08/17. Thank you. Artur Borjas Please respond to pool: EM IM LMB LPN/CMA

## 2018-03-20 ENCOUNTER — NURSE TRIAGE (OUTPATIENT)
Dept: OTHER | Age: 53
End: 2018-03-20

## 2018-03-20 ENCOUNTER — TELEPHONE (OUTPATIENT)
Dept: INTERNAL MEDICINE CLINIC | Facility: CLINIC | Age: 53
End: 2018-03-20

## 2018-03-20 ENCOUNTER — PATIENT MESSAGE (OUTPATIENT)
Dept: INTERNAL MEDICINE CLINIC | Facility: CLINIC | Age: 53
End: 2018-03-20

## 2018-03-20 DIAGNOSIS — Z00.00 PHYSICAL EXAM, ANNUAL: Primary | ICD-10-CM

## 2018-03-20 DIAGNOSIS — E78.00 PURE HYPERCHOLESTEROLEMIA: ICD-10-CM

## 2018-03-20 RX ORDER — AZITHROMYCIN 250 MG/1
TABLET, FILM COATED ORAL
Qty: 6 TABLET | Refills: 0 | Status: SHIPPED | OUTPATIENT
Start: 2018-03-20 | End: 2018-04-30

## 2018-03-20 NOTE — TELEPHONE ENCOUNTER
Patient called and informed with understanding. Instructed needs to fast for the labs for at least 12 hours but may have as much water as needed.

## 2018-03-20 NOTE — TELEPHONE ENCOUNTER
My chart message, ----- Message -----     From: Adriana IBARRAAdina Araujoo     Sent: 3/20/2018 12:40 PM CDT       To:  Isaiah Jeans, MD  Subject: Other    [facility] Doctor, I've made an appointment to see you April 30th but I have a chest cold i'm taking Mucinex but I was wo

## 2018-03-20 NOTE — TELEPHONE ENCOUNTER
Action Requested: Summary for Provider     []  Critical Lab, Recommendations Needed  [] Need Additional Advice  []   FYI    []   Need Orders  [] Need Medications Sent to Pharmacy  []  Other     SUMMARY:    Patient is asking for a z jose.     Patient stated f

## 2018-03-20 NOTE — TELEPHONE ENCOUNTER
----- Message from [address] [place] [address].  Dane sent at 3/20/2018 12:40 PM CDT -----  Regarding: Other  Contact: [phone]  Hello Doctor, I've made an appointment to see you April 30th but I have a chest cold i'm taking Mucinex but I was wondering if you could please g

## 2018-03-20 NOTE — TELEPHONE ENCOUNTER
From: Darin Steiner  To: Pete Stewart MD  Sent: 3/20/2018 12:40 PM CDT  Subject: Other    [facility] Doctor, I've made an appointment to see you April 30th but I have a chest cold i'm taking Mucinex but I was wondering if you could please give me an antibiotic

## 2018-03-20 NOTE — TELEPHONE ENCOUNTER
I FAXED RX FOR z PACK IF SHE IS NOT BETTER NEEDS TO BE SEEN  ANY DOCOTOR AT THE CLINIC IS FINE , LET HER KNOW THAT LABS FOR PHYSICAL PLACED

## 2018-04-26 RX ORDER — LEVOTHYROXINE SODIUM 0.12 MG/1
TABLET ORAL
Qty: 30 TABLET | Refills: 6 | Status: SHIPPED | OUTPATIENT
Start: 2018-04-26 | End: 2018-04-30

## 2018-04-26 RX ORDER — DOXEPIN HYDROCHLORIDE 10 MG/1
CAPSULE ORAL
Qty: 30 CAPSULE | Refills: 6 | Status: SHIPPED | OUTPATIENT
Start: 2018-04-26 | End: 2018-11-29

## 2018-04-27 NOTE — TELEPHONE ENCOUNTER
Pt calling for update on med refill request.  Informed refilled 4/26/18 to F/U w/ pharmacy.   Pt verbalized understanding and compliance

## 2018-04-30 ENCOUNTER — LAB ENCOUNTER (OUTPATIENT)
Dept: LAB | Age: 53
End: 2018-04-30
Attending: INTERNAL MEDICINE
Payer: COMMERCIAL

## 2018-04-30 ENCOUNTER — OFFICE VISIT (OUTPATIENT)
Dept: INTERNAL MEDICINE CLINIC | Facility: CLINIC | Age: 53
End: 2018-04-30

## 2018-04-30 VITALS
WEIGHT: 141 LBS | HEIGHT: 66 IN | HEART RATE: 69 BPM | SYSTOLIC BLOOD PRESSURE: 113 MMHG | TEMPERATURE: 98 F | DIASTOLIC BLOOD PRESSURE: 71 MMHG | BODY MASS INDEX: 22.66 KG/M2 | RESPIRATION RATE: 18 BRPM

## 2018-04-30 DIAGNOSIS — Z00.00 PHYSICAL EXAM, ANNUAL: ICD-10-CM

## 2018-04-30 DIAGNOSIS — Z00.00 PHYSICAL EXAM, ANNUAL: Primary | ICD-10-CM

## 2018-04-30 DIAGNOSIS — Z12.11 COLON CANCER SCREENING: ICD-10-CM

## 2018-04-30 DIAGNOSIS — E03.9 HYPOTHYROIDISM, UNSPECIFIED TYPE: ICD-10-CM

## 2018-04-30 DIAGNOSIS — E78.00 PURE HYPERCHOLESTEROLEMIA: ICD-10-CM

## 2018-04-30 DIAGNOSIS — Z01.419 ENCOUNTER FOR ANNUAL ROUTINE GYNECOLOGICAL EXAMINATION: ICD-10-CM

## 2018-04-30 DIAGNOSIS — Z12.31 BREAST CANCER SCREENING BY MAMMOGRAM: ICD-10-CM

## 2018-04-30 PROCEDURE — 84480 ASSAY TRIIODOTHYRONINE (T3): CPT

## 2018-04-30 PROCEDURE — 80053 COMPREHEN METABOLIC PANEL: CPT

## 2018-04-30 PROCEDURE — 82550 ASSAY OF CK (CPK): CPT

## 2018-04-30 PROCEDURE — 85025 COMPLETE CBC W/AUTO DIFF WBC: CPT

## 2018-04-30 PROCEDURE — 36415 COLL VENOUS BLD VENIPUNCTURE: CPT

## 2018-04-30 PROCEDURE — 80050 GENERAL HEALTH PANEL: CPT

## 2018-04-30 PROCEDURE — 80061 LIPID PANEL: CPT

## 2018-04-30 PROCEDURE — 99396 PREV VISIT EST AGE 40-64: CPT | Performed by: INTERNAL MEDICINE

## 2018-04-30 PROCEDURE — 84439 ASSAY OF FREE THYROXINE: CPT

## 2018-04-30 RX ORDER — LEVOTHYROXINE SODIUM 112 UG/1
112 TABLET ORAL
Qty: 90 TABLET | Refills: 1 | Status: SHIPPED | OUTPATIENT
Start: 2018-04-30 | End: 2018-12-17

## 2018-05-01 NOTE — PROGRESS NOTES
HPI:    Patient ID: Rosie Mixon is a 46year old female.   Presents for a physical exam including GYN exam.    HPI  Patient reports that overall she has been feeling well, she cut down doxepin at night, feels that she may be able to discontinue medication a DAILY. Disp: 30 tablet Rfl: 10   escitalopram 10 MG Oral Tab Take 1 tablet (10 mg total) by mouth once daily. Disp: 30 tablet Rfl: 5   ATORVASTATIN 10 MG Oral Tab TAKE 1 TABLET BY MOUTH NIGHTLY.  Disp: 30 tablet Rfl: 5     Allergies:No Known Allergies   BP colonoscopy, provided with names of clinic gastroenterologist  Encounter for annual routine gynecological examination  type follow Pap smear and HPV, next Pap in 3 years if cardiac testing is normal  Breast cancer screening by mammogram  Colon cancer scree

## 2018-05-05 ENCOUNTER — PATIENT MESSAGE (OUTPATIENT)
Dept: INTERNAL MEDICINE CLINIC | Facility: CLINIC | Age: 53
End: 2018-05-05

## 2018-05-05 NOTE — TELEPHONE ENCOUNTER
From: Antoine Found  To: Yenni Kulkarni MD  Sent: 5/5/2018 9:32 AM CDT  Subject: Other    Hello Dr. can you tell me how long I've been talking the doxepin?

## 2018-05-21 RX ORDER — ATORVASTATIN CALCIUM 10 MG/1
TABLET, FILM COATED ORAL
Qty: 30 TABLET | Refills: 4 | Status: SHIPPED | OUTPATIENT
Start: 2018-05-21 | End: 2018-10-29

## 2018-07-23 ENCOUNTER — TELEPHONE (OUTPATIENT)
Dept: INTERNAL MEDICINE CLINIC | Facility: CLINIC | Age: 53
End: 2018-07-23

## 2018-07-23 ENCOUNTER — LAB ENCOUNTER (OUTPATIENT)
Dept: LAB | Age: 53
End: 2018-07-23
Attending: INTERNAL MEDICINE
Payer: COMMERCIAL

## 2018-07-23 DIAGNOSIS — E03.9 HYPOTHYROIDISM, UNSPECIFIED TYPE: ICD-10-CM

## 2018-07-23 LAB
T3 SERPL-MCNC: 1.16 NG/ML (ref 0.87–1.78)
T4 FREE SERPL-MCNC: 1.08 NG/DL (ref 0.58–1.64)
TSH SERPL-ACNC: 0.05 UIU/ML (ref 0.45–5.33)

## 2018-07-23 PROCEDURE — 84480 ASSAY TRIIODOTHYRONINE (T3): CPT

## 2018-07-23 PROCEDURE — 36415 COLL VENOUS BLD VENIPUNCTURE: CPT

## 2018-07-23 PROCEDURE — 84443 ASSAY THYROID STIM HORMONE: CPT

## 2018-07-23 PROCEDURE — 84439 ASSAY OF FREE THYROXINE: CPT

## 2018-07-23 NOTE — TELEPHONE ENCOUNTER
Patient calling to state she has almost finished new dose of levothyroxine, asking if she is due for lab work, chart reviewed, Dr Rubén Teresa orders f/u TSH, order on chart, patient advised to go to lab for non fasting labwork

## 2018-07-25 ENCOUNTER — TELEPHONE (OUTPATIENT)
Dept: INTERNAL MEDICINE CLINIC | Facility: CLINIC | Age: 53
End: 2018-07-25

## 2018-07-25 NOTE — TELEPHONE ENCOUNTER
Nemesio broderick Dr left a message on her mychart to giver her a call. Nemesio broderick its regarding her blood test she had.         Please advise

## 2018-07-27 ENCOUNTER — TELEPHONE (OUTPATIENT)
Dept: INTERNAL MEDICINE CLINIC | Facility: CLINIC | Age: 53
End: 2018-07-27

## 2018-07-27 DIAGNOSIS — E89.0 POSTOPERATIVE HYPOTHYROIDISM: Primary | ICD-10-CM

## 2018-07-27 RX ORDER — LEVOTHYROXINE SODIUM 0.1 MG/1
100 TABLET ORAL DAILY
Qty: 90 TABLET | Refills: 3 | Status: SHIPPED | OUTPATIENT
Start: 2018-07-27 | End: 2019-06-05

## 2018-07-27 NOTE — TELEPHONE ENCOUNTER
Spoke to patient, reviewed recent lab test results, patient will decrease levothyroxine intake 100 mcg daily, will recheck TSH in 2 months, order placed, medication faxed

## 2018-09-24 ENCOUNTER — PATIENT MESSAGE (OUTPATIENT)
Dept: INTERNAL MEDICINE CLINIC | Facility: CLINIC | Age: 53
End: 2018-09-24

## 2018-09-24 NOTE — TELEPHONE ENCOUNTER
From: Rosie Mixon  To: Marc Munoz MD  Sent: 9/24/2018 5:35 PM CDT  Subject: Other    Hello Dr. Zulay Goodman I have a touch of a cold and I need a z pack and coughing a little ASAP thank you

## 2018-09-24 NOTE — TELEPHONE ENCOUNTER
From: Fabi Goldberg  To: Katheryn Bunn MD  Sent: 9/24/2018 6:46 PM CDT  Subject: Other    Hello Dr. Tl Garvin I have a touch of a cold and I need a z pack and coughing a little ASAP thank you

## 2018-09-25 ENCOUNTER — PATIENT MESSAGE (OUTPATIENT)
Dept: INTERNAL MEDICINE CLINIC | Facility: CLINIC | Age: 53
End: 2018-09-25

## 2018-09-25 ENCOUNTER — NURSE TRIAGE (OUTPATIENT)
Dept: OTHER | Age: 53
End: 2018-09-25

## 2018-09-26 ENCOUNTER — TELEPHONE (OUTPATIENT)
Dept: INTERNAL MEDICINE CLINIC | Facility: CLINIC | Age: 53
End: 2018-09-26

## 2018-09-26 RX ORDER — AZITHROMYCIN 250 MG/1
TABLET, FILM COATED ORAL
Qty: 6 TABLET | Refills: 0 | Status: SHIPPED | OUTPATIENT
Start: 2018-09-26 | End: 2018-09-30

## 2018-09-26 NOTE — TELEPHONE ENCOUNTER
Action Requested: Summary for Provider     []  Critical Lab, Recommendations Needed  [] Need Additional Advice  []   FYI    []   Need Orders  [] Need Medications Sent to Pharmacy  []  Other     SUMMARY: Pt requesting Z-Pack from [address]; states that always help

## 2018-09-26 NOTE — TELEPHONE ENCOUNTER
Triage RN=please call and triage the patient.    ----- Message -----     From: Adriana Vela     Sent: 9/25/2018  5:46 PM CDT       To:  Omar Lawson MD  Subject: Non-Urgent Medical Question    Hello Doctor, I have a cold and cough can I please get a z pack

## 2018-09-26 NOTE — TELEPHONE ENCOUNTER
Pt is calling back   Pt stts to [facility] she has to start school this morning   Pt stts all she need is a Z-pack   Pt has no problem with seeing pcp her only off day Friday and if she need to be seen can she be doubled booked     Please call back # [phone]

## 2018-09-26 NOTE — TELEPHONE ENCOUNTER
Please see acute TE 9/25/18, will close this encounter. From:  Shaquille Belcher  To: Rebeca Mccoy MD  Sent: 9/25/2018  5:46 PM CDT  Subject: Non-Urgent Medical Question    Hello Doctor, I have a cold and cough can I please get a z pack I will set up an appo

## 2018-10-27 RX ORDER — ESCITALOPRAM OXALATE 10 MG/1
TABLET ORAL
Qty: 30 TABLET | Refills: 4 | Status: SHIPPED | OUTPATIENT
Start: 2018-10-27 | End: 2019-03-30

## 2018-10-29 RX ORDER — ATORVASTATIN CALCIUM 10 MG/1
TABLET, FILM COATED ORAL
Qty: 30 TABLET | Refills: 1 | Status: SHIPPED | OUTPATIENT
Start: 2018-10-29 | End: 2019-01-15

## 2018-11-30 RX ORDER — DOXEPIN HYDROCHLORIDE 10 MG/1
CAPSULE ORAL
Qty: 30 CAPSULE | Refills: 3 | Status: SHIPPED | OUTPATIENT
Start: 2018-11-30 | End: 2019-03-30

## 2018-12-17 ENCOUNTER — OFFICE VISIT (OUTPATIENT)
Dept: INTERNAL MEDICINE CLINIC | Facility: CLINIC | Age: 53
End: 2018-12-17
Payer: COMMERCIAL

## 2018-12-17 ENCOUNTER — APPOINTMENT (OUTPATIENT)
Dept: LAB | Age: 53
End: 2018-12-17
Attending: INTERNAL MEDICINE
Payer: COMMERCIAL

## 2018-12-17 VITALS
BODY MASS INDEX: 23 KG/M2 | HEART RATE: 67 BPM | RESPIRATION RATE: 18 BRPM | TEMPERATURE: 98 F | DIASTOLIC BLOOD PRESSURE: 83 MMHG | WEIGHT: 140 LBS | SYSTOLIC BLOOD PRESSURE: 138 MMHG

## 2018-12-17 DIAGNOSIS — E89.0 POSTOPERATIVE HYPOTHYROIDISM: ICD-10-CM

## 2018-12-17 DIAGNOSIS — J01.81 OTHER ACUTE RECURRENT SINUSITIS: Primary | ICD-10-CM

## 2018-12-17 DIAGNOSIS — E89.0 POSTSURGICAL HYPOTHYROIDISM: ICD-10-CM

## 2018-12-17 PROCEDURE — 99212 OFFICE O/P EST SF 10 MIN: CPT | Performed by: INTERNAL MEDICINE

## 2018-12-17 PROCEDURE — 36415 COLL VENOUS BLD VENIPUNCTURE: CPT

## 2018-12-17 PROCEDURE — 84443 ASSAY THYROID STIM HORMONE: CPT

## 2018-12-17 PROCEDURE — 99214 OFFICE O/P EST MOD 30 MIN: CPT | Performed by: INTERNAL MEDICINE

## 2018-12-17 RX ORDER — CEFDINIR 300 MG/1
300 CAPSULE ORAL 2 TIMES DAILY
Qty: 14 CAPSULE | Refills: 0 | Status: SHIPPED | OUTPATIENT
Start: 2018-12-17 | End: 2019-03-11

## 2018-12-17 NOTE — PROGRESS NOTES
HPI:    Patient ID: Darin Steiner is a 46year old female.   Presents for evaluation of the cough  HPI  Patient reports that 5 days ago she developed sore throat which lasted 2 days, feels more tired, now has nasal congestion pressure in her head and she has arm, Patient Position: Sitting, Cuff Size: adult)   Pulse 67   Temp 98.3 °F (36.8 °C) (Oral)   Resp 18   Wt 140 lb (63.5 kg)   BMI 22.60 kg/m²    Physical Exam    Constitutional: She appears well-developed and well-nourished. No distress.    HENT:   Head: N

## 2019-01-16 RX ORDER — ATORVASTATIN CALCIUM 10 MG/1
TABLET, FILM COATED ORAL
Qty: 30 TABLET | Refills: 5 | Status: SHIPPED | OUTPATIENT
Start: 2019-01-16 | End: 2019-01-16

## 2019-01-30 RX ORDER — NADOLOL 20 MG/1
TABLET ORAL
Qty: 30 TABLET | Refills: 11 | Status: SHIPPED | OUTPATIENT
Start: 2019-01-30 | End: 2019-06-05

## 2019-03-07 ENCOUNTER — NURSE TRIAGE (OUTPATIENT)
Dept: OTHER | Age: 54
End: 2019-03-07

## 2019-03-07 ENCOUNTER — TELEPHONE (OUTPATIENT)
Dept: INTERNAL MEDICINE CLINIC | Facility: CLINIC | Age: 54
End: 2019-03-07

## 2019-03-07 RX ORDER — AZITHROMYCIN 250 MG/1
TABLET, FILM COATED ORAL
Qty: 6 TABLET | Refills: 0 | Status: SHIPPED | OUTPATIENT
Start: 2019-03-07 | End: 2019-03-11

## 2019-03-07 RX ORDER — ALBUTEROL SULFATE 90 UG/1
2 AEROSOL, METERED RESPIRATORY (INHALATION) EVERY 4 HOURS PRN
Qty: 1 INHALER | Refills: 1 | Status: SHIPPED | OUTPATIENT
Start: 2019-03-07 | End: 2019-12-16

## 2019-03-07 NOTE — TELEPHONE ENCOUNTER
Action Requested: Summary for Provider     []  Critical Lab, Recommendations Needed  [] Need Additional Advice  []   FYI    []   Need Orders  [] Need Medications Sent to Pharmacy  []  Other     SUMMARY:    Patient requesting a Z Raza    Patient stated since

## 2019-03-08 NOTE — TELEPHONE ENCOUNTER
Spoke to patient, advised that we will prescribe Z-Raza and Ventolin inhaler if she is not feeling better or feeling more congestion wheezing tightness in the chest should be evaluated in the office or go to immediate care center patient agreed

## 2019-03-11 ENCOUNTER — HOSPITAL ENCOUNTER (OUTPATIENT)
Dept: GENERAL RADIOLOGY | Age: 54
Discharge: HOME | End: 2019-03-11
Attending: INTERNAL MEDICINE
Payer: COMMERCIAL

## 2019-03-11 ENCOUNTER — OFFICE VISIT (OUTPATIENT)
Dept: INTERNAL MEDICINE CLINIC | Facility: CLINIC | Age: 54
End: 2019-03-11
Payer: COMMERCIAL

## 2019-03-11 VITALS
DIASTOLIC BLOOD PRESSURE: 85 MMHG | SYSTOLIC BLOOD PRESSURE: 129 MMHG | HEART RATE: 67 BPM | HEIGHT: 66 IN | RESPIRATION RATE: 18 BRPM | BODY MASS INDEX: 23.14 KG/M2 | TEMPERATURE: 98 F | WEIGHT: 144 LBS

## 2019-03-11 DIAGNOSIS — R05.9 COUGH: ICD-10-CM

## 2019-03-11 DIAGNOSIS — J20.9 BRONCHITIS WITH BRONCHOSPASM: Primary | ICD-10-CM

## 2019-03-11 PROCEDURE — 99214 OFFICE O/P EST MOD 30 MIN: CPT | Performed by: INTERNAL MEDICINE

## 2019-03-11 PROCEDURE — 71046 X-RAY EXAM CHEST 2 VIEWS: CPT | Performed by: INTERNAL MEDICINE

## 2019-03-11 PROCEDURE — 94640 AIRWAY INHALATION TREATMENT: CPT | Performed by: INTERNAL MEDICINE

## 2019-03-11 PROCEDURE — 99212 OFFICE O/P EST SF 10 MIN: CPT | Performed by: INTERNAL MEDICINE

## 2019-03-11 RX ORDER — ALBUTEROL SULFATE 2.5 MG/3ML
2.5 SOLUTION RESPIRATORY (INHALATION) ONCE
Status: COMPLETED | OUTPATIENT
Start: 2019-03-11 | End: 2019-03-11

## 2019-03-11 RX ORDER — PREDNISONE 10 MG/1
TABLET ORAL
Qty: 12 TABLET | Refills: 0 | Status: SHIPPED | OUTPATIENT
Start: 2019-03-11 | End: 2019-06-05 | Stop reason: ALTCHOICE

## 2019-03-11 RX ADMIN — ALBUTEROL SULFATE 2.5 MG: 2.5 SOLUTION RESPIRATORY (INHALATION) at 17:31:00

## 2019-03-11 NOTE — PROGRESS NOTES
HPI:    Patient ID: Rosie Mixon is a 46year old female.     HPI  3 days ago started to experience flu type symptoms, body aches fever sore throat, symptoms involved in a frequent cough with occasional phlegm production, frontal headache, cough associated w capsule Rfl: 3   ESCITALOPRAM 10 MG Oral Tab TAKE ONE TABLET BY MOUTH ONE TIME DAILY  Disp: 30 tablet Rfl: 4   Levothyroxine Sodium 100 MCG Oral Tab Take 1 tablet (100 mcg total) by mouth daily.  Disp: 90 tablet Rfl: 3     Allergies:No Known Allergies   BP Refills   • predniSONE 10 MG Oral Tab 12 tablet 0     Sig: Take 30 mg PO daily x 2 days, Take 20 mg PO daily x 2 days, Take 10 mg PO daily x 2 days       Imaging & Referrals:  XR CHEST PA + LAT CHEST (CPT=[id number])         #[id number]

## 2019-03-13 ENCOUNTER — TELEPHONE (OUTPATIENT)
Dept: INTERNAL MEDICINE CLINIC | Facility: CLINIC | Age: 54
End: 2019-03-13

## 2019-03-13 ENCOUNTER — TELEPHONE (OUTPATIENT)
Dept: OTHER | Age: 54
End: 2019-03-13

## 2019-03-13 NOTE — TELEPHONE ENCOUNTER
Pt states she was seen in office 3/11/19 and given letter to return to work on 3/12/19 with recommendation to call office if she was not able to return to work at that time.  Pt did not return to work and needs another letter allowing her to stay home an ex

## 2019-03-13 NOTE — TELEPHONE ENCOUNTER
Pt stated You advised if she was not better to call back and Work letter could be extended-  Pt stated she was not better and  was suppose to return to work Today wants letter to stated return Tomorrow -   Please Fax to [phone]  Please respond to pool

## 2019-03-13 NOTE — TELEPHONE ENCOUNTER
Spoke to patient, she is feeling better, will fax note as requested, she provided phone number patient will get this note today to provide to employer

## 2019-03-30 RX ORDER — DOXEPIN HYDROCHLORIDE 10 MG/1
CAPSULE ORAL
Qty: 30 CAPSULE | Refills: 3 | OUTPATIENT
Start: 2019-03-30

## 2019-03-30 RX ORDER — ESCITALOPRAM OXALATE 10 MG/1
TABLET ORAL
Qty: 90 TABLET | Refills: 0 | Status: SHIPPED | OUTPATIENT
Start: 2019-03-30 | End: 2019-06-05

## 2019-03-30 RX ORDER — DOXEPIN HYDROCHLORIDE 10 MG/1
CAPSULE ORAL
Qty: 90 CAPSULE | Refills: 0 | Status: SHIPPED | OUTPATIENT
Start: 2019-03-30 | End: 2019-06-05

## 2019-03-30 RX ORDER — ESCITALOPRAM OXALATE 10 MG/1
10 TABLET ORAL
Qty: 30 TABLET | Refills: 4 | OUTPATIENT
Start: 2019-03-30

## 2019-05-31 ENCOUNTER — NURSE TRIAGE (OUTPATIENT)
Dept: OTHER | Age: 54
End: 2019-05-31

## 2019-05-31 ENCOUNTER — PATIENT MESSAGE (OUTPATIENT)
Dept: INTERNAL MEDICINE CLINIC | Facility: CLINIC | Age: 54
End: 2019-05-31

## 2019-05-31 RX ORDER — AZITHROMYCIN 250 MG/1
TABLET, FILM COATED ORAL
Qty: 6 TABLET | Refills: 0 | Status: SHIPPED | OUTPATIENT
Start: 2019-05-31 | End: 2019-06-04

## 2019-05-31 NOTE — TELEPHONE ENCOUNTER
From: Marvel Ladd  To: Carola Perez MD  Sent: 5/31/2019 8:25 AM CDT  Subject: Prescription Question    [facility] Doctor, I woke up today with a cold a cough not as bad as last time but could I get   Antibiotic to get rid of this please          Thank you

## 2019-05-31 NOTE — TELEPHONE ENCOUNTER
----- Message from [address] [place] [address].  Adne sent at 5/31/2019  8:25 AM CDT -----  Regarding: Prescription Question  Contact: [phone]  Dedrick Doctor, I woke up today with a cold a cough not as bad as last time but could I get   Antibiotic to get rid of this please

## 2019-05-31 NOTE — TELEPHONE ENCOUNTER
Patient calling to follow-up on message below. Patient states she would like antibiotic as soon as possible. Spoke to Dr. Jono Yeager and she stated she will call patient.

## 2019-05-31 NOTE — TELEPHONE ENCOUNTER
Action Requested: Summary for Provider     []  Critical Lab, Recommendations Needed  [] Need Additional Advice  []   FYI    []   Need Orders  [] Need Medications Sent to Pharmacy  []  Other     SUMMARY: Dr Olvera Founds:  Patient declined to come in.  She request

## 2019-06-05 ENCOUNTER — HOSPITAL ENCOUNTER (OUTPATIENT)
Dept: GENERAL RADIOLOGY | Age: 54
Discharge: HOME | End: 2019-06-05
Attending: PHYSICIAN ASSISTANT
Payer: OTHER MISCELLANEOUS

## 2019-06-05 ENCOUNTER — OFFICE VISIT (OUTPATIENT)
Dept: FAMILY MEDICINE CLINIC | Facility: CLINIC | Age: 54
End: 2019-06-05
Payer: COMMERCIAL

## 2019-06-05 ENCOUNTER — NURSE TRIAGE (OUTPATIENT)
Dept: OTHER | Age: 54
End: 2019-06-05

## 2019-06-05 VITALS
SYSTOLIC BLOOD PRESSURE: 130 MMHG | RESPIRATION RATE: 20 BRPM | HEART RATE: 80 BPM | WEIGHT: 141 LBS | DIASTOLIC BLOOD PRESSURE: 87 MMHG | BODY MASS INDEX: 22.66 KG/M2 | HEIGHT: 66 IN

## 2019-06-05 DIAGNOSIS — G89.29 CHRONIC BILATERAL LOW BACK PAIN WITHOUT SCIATICA: ICD-10-CM

## 2019-06-05 DIAGNOSIS — M54.50 CHRONIC BILATERAL LOW BACK PAIN WITHOUT SCIATICA: ICD-10-CM

## 2019-06-05 DIAGNOSIS — M62.830 SPASM OF MUSCLE OF LOWER BACK: Primary | ICD-10-CM

## 2019-06-05 PROCEDURE — 72110 X-RAY EXAM L-2 SPINE 4/>VWS: CPT | Performed by: PHYSICIAN ASSISTANT

## 2019-06-05 PROCEDURE — 99213 OFFICE O/P EST LOW 20 MIN: CPT | Performed by: PHYSICIAN ASSISTANT

## 2019-06-05 PROCEDURE — 99212 OFFICE O/P EST SF 10 MIN: CPT | Performed by: PHYSICIAN ASSISTANT

## 2019-06-05 PROCEDURE — 72220 X-RAY EXAM SACRUM TAILBONE: CPT | Performed by: PHYSICIAN ASSISTANT

## 2019-06-05 RX ORDER — LEVOTHYROXINE SODIUM 0.1 MG/1
100 TABLET ORAL DAILY
Qty: 90 TABLET | Refills: 3 | Status: SHIPPED | OUTPATIENT
Start: 2019-06-05 | End: 2020-05-30

## 2019-06-05 RX ORDER — DOXEPIN HYDROCHLORIDE 10 MG/1
CAPSULE ORAL
Qty: 90 CAPSULE | Refills: 0 | Status: SHIPPED | OUTPATIENT
Start: 2019-06-05 | End: 2019-09-21

## 2019-06-05 RX ORDER — CYCLOBENZAPRINE HCL 10 MG
10 TABLET ORAL 3 TIMES DAILY
Qty: 30 TABLET | Refills: 1 | Status: SHIPPED | OUTPATIENT
Start: 2019-06-05 | End: 2019-06-25

## 2019-06-05 RX ORDER — NADOLOL 20 MG/1
20 TABLET ORAL
Qty: 30 TABLET | Refills: 11 | Status: SHIPPED | OUTPATIENT
Start: 2019-06-05 | End: 2020-05-31

## 2019-06-05 RX ORDER — ATORVASTATIN CALCIUM 10 MG/1
TABLET, FILM COATED ORAL
Qty: 90 TABLET | Refills: 1 | Status: SHIPPED | OUTPATIENT
Start: 2019-06-05 | End: 2019-11-29

## 2019-06-05 RX ORDER — ESCITALOPRAM OXALATE 10 MG/1
10 TABLET ORAL
Qty: 90 TABLET | Refills: 0 | Status: SHIPPED | OUTPATIENT
Start: 2019-06-05 | End: 2019-08-12

## 2019-06-05 NOTE — TELEPHONE ENCOUNTER
Pt needs to be  evaluated before any letters can be provided  And cannot see pt today,  She can see any provider avalialble  At the clinic FP OR IM

## 2019-06-05 NOTE — TELEPHONE ENCOUNTER
Verified pt name and . Reviewed doctor's appt recommendations as noted below. Appt scheduled today with Dr. Chrissy Quintana, pt had no further questions at this time.

## 2019-06-05 NOTE — TELEPHONE ENCOUNTER
Action Requested: Summary for Provider     []  Critical Lab, Recommendations Needed  [x] Need Additional Advice  []   FYI    []   Need Orders  [] Need Medications Sent to Pharmacy  []  Other     SUMMARY: Patient requesting a letter from Dr. Thiago Johnson for her

## 2019-06-05 NOTE — PROGRESS NOTES
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 Patient states that back pain is worse when she woke up today.  The pain Surgical History:     Past Surgical History:   Procedure Laterality Date   • Thyroid ablation, carcinoma     • Tonsillectomy         Family History:     Family History   Problem Relation Age of Onset   • Lipids Brother 62   • Heart Disease Father 72 Concern: Yes          soda, coffee, 1 cup daily        Occupational Exposure: Not Asked        Hobby Hazards: Not Asked        Sleep Concern: Not Asked        Stress Concern: Not Asked        Weight Concern: Not Asked        Special Diet: Not Asked are normal.   Cardiovascular: Normal rate, regular rhythm, S1 normal, S2 normal and normal heart sounds. No murmur heard. Pulmonary/Chest: Effort normal and breath sounds normal. She has no wheezes. She has no rales. She exhibits no tenderness.    Abdom

## 2019-06-06 ENCOUNTER — PATIENT MESSAGE (OUTPATIENT)
Dept: FAMILY MEDICINE CLINIC | Facility: CLINIC | Age: 54
End: 2019-06-06

## 2019-06-06 ENCOUNTER — TELEPHONE (OUTPATIENT)
Dept: FAMILY MEDICINE CLINIC | Facility: CLINIC | Age: 54
End: 2019-06-06

## 2019-06-06 PROBLEM — M62.830 SPASM OF MUSCLE OF LOWER BACK: Status: ACTIVE | Noted: 2019-06-06

## 2019-06-06 NOTE — ASSESSMENT & PLAN NOTE
Continue with Advil as needed for pain. Start Flexeril 10 mg PO TID for muscle spasm.  Order: x-ray of L/S.

## 2019-06-06 NOTE — TELEPHONE ENCOUNTER
Min: Patient would like to speak to you first about her xray results, as well as a letter that is needed. (she states she discussed this yesterday at [address])    Patient is asking for results. She just wants to make sure there is no arthritis.      Patient is

## 2019-06-06 NOTE — TELEPHONE ENCOUNTER
Pt states that the fax number that she provided is not working. Pt would like the note/letter faxed to [phone] attn: [facility]. Per pt this is her work fax number and this goes directly to her boss. Pt would like this done today.

## 2019-06-06 NOTE — TELEPHONE ENCOUNTER
Note faxed to new number provided w att: Danitza Pastel. Faxed successfully , received confirmation. No additional action needed.

## 2019-06-26 RX ORDER — DOXEPIN HYDROCHLORIDE 10 MG/1
CAPSULE ORAL
Qty: 90 CAPSULE | Refills: 0 | OUTPATIENT
Start: 2019-06-26

## 2019-06-26 RX ORDER — ESCITALOPRAM OXALATE 10 MG/1
10 TABLET ORAL
Qty: 90 TABLET | Refills: 0 | OUTPATIENT
Start: 2019-06-26

## 2019-06-26 RX ORDER — ESCITALOPRAM OXALATE 10 MG/1
TABLET ORAL
Qty: 90 TABLET | Refills: 0 | OUTPATIENT
Start: 2019-06-26

## 2019-06-28 ENCOUNTER — OFFICE VISIT (OUTPATIENT)
Dept: INTERNAL MEDICINE CLINIC | Facility: CLINIC | Age: 54
End: 2019-06-28
Payer: COMMERCIAL

## 2019-06-28 VITALS
RESPIRATION RATE: 14 BRPM | BODY MASS INDEX: 23.33 KG/M2 | TEMPERATURE: 98 F | DIASTOLIC BLOOD PRESSURE: 71 MMHG | WEIGHT: 145.19 LBS | HEIGHT: 66 IN | HEART RATE: 53 BPM | SYSTOLIC BLOOD PRESSURE: 107 MMHG

## 2019-06-28 DIAGNOSIS — S39.012D STRAIN OF LUMBAR REGION, SUBSEQUENT ENCOUNTER: Primary | ICD-10-CM

## 2019-06-28 PROCEDURE — 99212 OFFICE O/P EST SF 10 MIN: CPT | Performed by: INTERNAL MEDICINE

## 2019-06-28 PROCEDURE — 99213 OFFICE O/P EST LOW 20 MIN: CPT | Performed by: INTERNAL MEDICINE

## 2019-06-28 NOTE — PROGRESS NOTES
HPI:    Patient ID: Gaviota Farmer is a 53year old female. Presents for evaluation of the back pain    HPI  Patient was seen recently in the office by nurse practitioner for evaluation of low back pain that she developed after she assisted patient to walk. 6\" (1.676 m)   Wt 145 lb 3.2 oz (65.9 kg)   BMI 23.44 kg/m²    Physical Exam    Constitutional: She is oriented to person, place, and time. She appears well-developed and well-nourished. No distress.    Cardiovascular: Normal rate, regular rhythm and yunier

## 2019-06-29 PROBLEM — M62.830 SPASM OF MUSCLE OF LOWER BACK: Status: RESOLVED | Noted: 2019-06-06 | Resolved: 2019-06-29

## 2019-06-29 PROBLEM — Z00.00 PHYSICAL EXAM, ROUTINE: Status: RESOLVED | Noted: 2017-01-31 | Resolved: 2019-06-29

## 2019-06-29 PROBLEM — Z12.39 BREAST CANCER SCREENING: Status: RESOLVED | Noted: 2017-01-31 | Resolved: 2019-06-29

## 2019-08-13 RX ORDER — ESCITALOPRAM OXALATE 10 MG/1
TABLET ORAL
Qty: 90 TABLET | Refills: 1 | Status: SHIPPED | OUTPATIENT
Start: 2019-08-13 | End: 2020-01-24

## 2019-08-14 NOTE — TELEPHONE ENCOUNTER
Refill passed per [facility] [place], [facility] protocol.     Refill Protocol Appointment Criteria  · Appointment scheduled in the past 6 months or in the next 3 months  Recent Outpatient Visits            1 month ago Strain of lumbar region, subsequent encounter    [facility]

## 2019-09-21 RX ORDER — DOXEPIN HYDROCHLORIDE 10 MG/1
CAPSULE ORAL
Qty: 90 CAPSULE | Refills: 1 | Status: SHIPPED | OUTPATIENT
Start: 2019-09-21 | End: 2020-02-23

## 2019-09-22 NOTE — TELEPHONE ENCOUNTER
Refill passed per [facility] [place], [facility] protocol.     Refill Protocol Appointment Criteria  · Appointment scheduled in the past 6 months or in the next 3 months  Recent Outpatient Visits            2 months ago Strain of lumbar region, subsequent encounter    [facility]

## 2019-09-23 ENCOUNTER — TELEPHONE (OUTPATIENT)
Dept: FAMILY MEDICINE CLINIC | Facility: CLINIC | Age: 54
End: 2019-09-23

## 2019-09-23 NOTE — TELEPHONE ENCOUNTER
Current Outpatient Medications:   •  DOXEPIN HCL 10 MG Oral Cap, TAKE 1 CAPSULE BY MOUTH NIGHTLY, Disp: 90 capsule, Rfl: 1

## 2019-09-23 NOTE — TELEPHONE ENCOUNTER
Refill addressed on    DOXEPIN HCL 10 MG Oral Cap 90 capsule 1 9/21/2019     Sig: TAKE 1 CAPSULE BY MOUTH NIGHTLY    Sent to pharmacy as: Doxepin HCl 10 MG Oral Capsule    E-Prescribing Status: Receipt confirmed by pharmacy (9/21/2019  9:50 PM CDT)    Henry Krueger

## 2019-12-01 RX ORDER — ATORVASTATIN CALCIUM 10 MG/1
TABLET, FILM COATED ORAL
Qty: 90 TABLET | Refills: 1 | Status: SHIPPED | OUTPATIENT
Start: 2019-12-01

## 2019-12-01 NOTE — TELEPHONE ENCOUNTER
Please review; protocol failed.     Requested Prescriptions   Pending Prescriptions Disp Refills   • atorvastatin 10 MG Oral Tab [Pharmacy Med Name: Atorvastatin Calcium 10 Mg Tab Nort] 90 tablet 0     Sig: TAKE 1 TABLET BY MOUTH NIGHTLY       Cholesterol M

## 2019-12-16 ENCOUNTER — PATIENT MESSAGE (OUTPATIENT)
Dept: INTERNAL MEDICINE CLINIC | Facility: CLINIC | Age: 54
End: 2019-12-16

## 2019-12-16 ENCOUNTER — NURSE TRIAGE (OUTPATIENT)
Dept: INTERNAL MEDICINE CLINIC | Facility: CLINIC | Age: 54
End: 2019-12-16

## 2019-12-16 RX ORDER — ALBUTEROL SULFATE 90 UG/1
2 AEROSOL, METERED RESPIRATORY (INHALATION) EVERY 4 HOURS PRN
Qty: 1 INHALER | Refills: 2 | Status: CANCELLED | OUTPATIENT
Start: 2019-12-16

## 2019-12-16 NOTE — TELEPHONE ENCOUNTER
Patient called in stating that she gets this asthmatic/bronchitis cough every year around this time and the only thing that helps is an antibiotic (Z-pack).      Last years she actually had to have a breathing treatment in the office and is trying to avoid

## 2019-12-16 NOTE — TELEPHONE ENCOUNTER
Patient states by end of the day on Friday 12/13 feeling cold like symptoms starting. Saturday she developed a cough with runny nose.     Creating Acute TE.

## 2020-01-24 NOTE — TELEPHONE ENCOUNTER
Please advise on refill request.     Refill Protocol Appointment Criteria  · Appointment scheduled in the past 6 months or in the next 3 months  Recent Outpatient Visits            7 months ago Strain of lumbar region, subsequent encounter    [facility]

## 2020-01-24 NOTE — TELEPHONE ENCOUNTER
Per patient she is inbetween job and as soon as she is going to have her insurance she will going to make an appointment but for now she don't have insurance yet.

## 2020-01-24 NOTE — TELEPHONE ENCOUNTER
To reception staff, pls call pt for appt. Also [employer] message sent to pt       Please review; protocol failed.      Requested Prescriptions     Pending Prescriptions Disp Refills   • ESCITALOPRAM 10 MG Oral Tab [Pharmacy Med Name: Escitalopram Oxalate 10

## 2020-01-25 RX ORDER — ESCITALOPRAM OXALATE 10 MG/1
TABLET ORAL
Qty: 90 TABLET | Refills: 0 | Status: SHIPPED | OUTPATIENT
Start: 2020-01-25 | End: 2020-04-27

## 2020-02-06 NOTE — TELEPHONE ENCOUNTER
[employer] message last read by patient as follows:   Last Read in SysClass  1/24/2020  1:36 PM by Sukhi Eagle

## 2020-02-23 RX ORDER — DOXEPIN HYDROCHLORIDE 10 MG/1
CAPSULE ORAL
Qty: 90 CAPSULE | Refills: 0 | Status: SHIPPED | OUTPATIENT
Start: 2020-02-23 | End: 2020-05-24

## 2020-02-23 NOTE — TELEPHONE ENCOUNTER
Please review; protocol failed. [facility] message sent to pt to pls schedule appt as pt is overdue.    Requested Prescriptions   Pending Prescriptions Disp Refills   • Doxepin HCl 10 MG Oral Cap [Pharmacy Med Name: Doxepin Hcl 10 Mg Cap Marilyn] 90 capsule 0

## 2020-03-30 ENCOUNTER — PATIENT MESSAGE (OUTPATIENT)
Dept: INTERNAL MEDICINE CLINIC | Facility: CLINIC | Age: 55
End: 2020-03-30

## 2020-03-31 NOTE — TELEPHONE ENCOUNTER
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  My concer

## 2020-04-01 NOTE — TELEPHONE ENCOUNTER
Pt called back. Would like to explain her job situation and her health condition.  Will like a call on cell [phone] after 5pm

## 2020-04-02 NOTE — TELEPHONE ENCOUNTER
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   I advised that in my opinion she is average risk for complications in case of con

## 2020-04-27 RX ORDER — ESCITALOPRAM OXALATE 10 MG/1
TABLET ORAL
Qty: 90 TABLET | Refills: 0 | Status: SHIPPED | OUTPATIENT
Start: 2020-04-27 | End: 2020-07-14

## 2020-05-24 RX ORDER — DOXEPIN HYDROCHLORIDE 10 MG/1
CAPSULE ORAL
Qty: 90 CAPSULE | Refills: 0 | Status: SHIPPED | OUTPATIENT
Start: 2020-05-24 | End: 2020-07-14

## 2020-05-27 RX ORDER — NADOLOL 20 MG/1
20 TABLET ORAL
Qty: 30 TABLET | Refills: 0 | OUTPATIENT
Start: 2020-05-27

## 2020-05-31 RX ORDER — NADOLOL 20 MG/1
20 TABLET ORAL
Qty: 30 TABLET | Refills: 0 | Status: SHIPPED | OUTPATIENT
Start: 2020-05-31 | End: 2020-06-30

## 2020-06-30 RX ORDER — NADOLOL 20 MG/1
20 TABLET ORAL
Qty: 30 TABLET | Refills: 0 | Status: SHIPPED | OUTPATIENT
Start: 2020-06-30 | End: 2020-07-14

## 2020-07-14 ENCOUNTER — LAB ENCOUNTER (OUTPATIENT)
Dept: LAB | Age: 55
End: 2020-07-14
Attending: INTERNAL MEDICINE
Payer: COMMERCIAL

## 2020-07-14 ENCOUNTER — OFFICE VISIT (OUTPATIENT)
Dept: INTERNAL MEDICINE CLINIC | Facility: CLINIC | Age: 55
End: 2020-07-14
Payer: COMMERCIAL

## 2020-07-14 VITALS
HEIGHT: 66 IN | SYSTOLIC BLOOD PRESSURE: 151 MMHG | DIASTOLIC BLOOD PRESSURE: 85 MMHG | HEART RATE: 58 BPM | WEIGHT: 145 LBS | TEMPERATURE: 99 F | BODY MASS INDEX: 23.3 KG/M2 | RESPIRATION RATE: 16 BRPM

## 2020-07-14 DIAGNOSIS — Z00.00 PHYSICAL EXAM, ANNUAL: ICD-10-CM

## 2020-07-14 DIAGNOSIS — R92.2 DENSE BREAST TISSUE ON MAMMOGRAM: ICD-10-CM

## 2020-07-14 DIAGNOSIS — Z00.00 PHYSICAL EXAM, ANNUAL: Primary | ICD-10-CM

## 2020-07-14 DIAGNOSIS — Z12.31 BREAST CANCER SCREENING BY MAMMOGRAM: ICD-10-CM

## 2020-07-14 LAB
ALBUMIN SERPL-MCNC: 4.1 G/DL (ref 3.4–5)
ALBUMIN/GLOB SERPL: 1 {RATIO} (ref 1–2)
ALP LIVER SERPL-CCNC: 64 U/L (ref 46–118)
ALT SERPL-CCNC: 31 U/L (ref 13–56)
ANION GAP SERPL CALC-SCNC: 4 MMOL/L (ref 0–18)
AST SERPL-CCNC: 26 U/L (ref 15–37)
BASOPHILS # BLD AUTO: 0.08 X10(3) UL (ref 0–0.2)
BASOPHILS NFR BLD AUTO: 1.3 %
BILIRUB SERPL-MCNC: 0.3 MG/DL (ref 0.1–2)
BUN BLD-MCNC: 11 MG/DL (ref 7–18)
BUN/CREAT SERPL: 14.9 (ref 10–20)
CALCIUM BLD-MCNC: 9.7 MG/DL (ref 8.5–10.1)
CHLORIDE SERPL-SCNC: 104 MMOL/L (ref 98–112)
CHOLEST SMN-MCNC: 209 MG/DL (ref ?–200)
CK SERPL-CCNC: 128 U/L (ref 26–192)
CO2 SERPL-SCNC: 33 MMOL/L (ref 21–32)
CREAT BLD-MCNC: 0.74 MG/DL (ref 0.55–1.02)
DEPRECATED RDW RBC AUTO: 41.9 FL (ref 35.1–46.3)
EOSINOPHIL # BLD AUTO: 0.31 X10(3) UL (ref 0–0.7)
EOSINOPHIL NFR BLD AUTO: 5 %
ERYTHROCYTE [DISTWIDTH] IN BLOOD BY AUTOMATED COUNT: 13.5 % (ref 11–15)
GLOBULIN PLAS-MCNC: 4.1 G/DL (ref 2.8–4.4)
GLUCOSE BLD-MCNC: 85 MG/DL (ref 70–99)
HCT VFR BLD AUTO: 38.6 % (ref 35–48)
HDLC SERPL-MCNC: 52 MG/DL (ref 40–59)
HGB BLD-MCNC: 12.2 G/DL (ref 12–16)
IMM GRANULOCYTES # BLD AUTO: 0.01 X10(3) UL (ref 0–1)
IMM GRANULOCYTES NFR BLD: 0.2 %
LDLC SERPL CALC-MCNC: 121 MG/DL (ref ?–100)
LYMPHOCYTES # BLD AUTO: 1.92 X10(3) UL (ref 1–4)
LYMPHOCYTES NFR BLD AUTO: 31 %
M PROTEIN MFR SERPL ELPH: 8.2 G/DL (ref 6.4–8.2)
MCH RBC QN AUTO: 26.8 PG (ref 26–34)
MCHC RBC AUTO-ENTMCNC: 31.6 G/DL (ref 31–37)
MCV RBC AUTO: 84.6 FL (ref 80–100)
MONOCYTES # BLD AUTO: 0.58 X10(3) UL (ref 0.1–1)
MONOCYTES NFR BLD AUTO: 9.4 %
NEUTROPHILS # BLD AUTO: 3.29 X10 (3) UL (ref 1.5–7.7)
NEUTROPHILS # BLD AUTO: 3.29 X10(3) UL (ref 1.5–7.7)
NEUTROPHILS NFR BLD AUTO: 53.1 %
NONHDLC SERPL-MCNC: 157 MG/DL (ref ?–130)
OSMOLALITY SERPL CALC.SUM OF ELEC: 291 MOSM/KG (ref 275–295)
PATIENT FASTING Y/N/NP: YES
PATIENT FASTING Y/N/NP: YES
PLATELET # BLD AUTO: 240 10(3)UL (ref 150–450)
POTASSIUM SERPL-SCNC: 4.4 MMOL/L (ref 3.5–5.1)
RBC # BLD AUTO: 4.56 X10(6)UL (ref 3.8–5.3)
SODIUM SERPL-SCNC: 141 MMOL/L (ref 136–145)
TRIGL SERPL-MCNC: 179 MG/DL (ref 30–149)
TSI SER-ACNC: 1.07 MIU/ML (ref 0.36–3.74)
VLDLC SERPL CALC-MCNC: 36 MG/DL (ref 0–30)
WBC # BLD AUTO: 6.2 X10(3) UL (ref 4–11)

## 2020-07-14 PROCEDURE — 80061 LIPID PANEL: CPT

## 2020-07-14 PROCEDURE — 82550 ASSAY OF CK (CPK): CPT

## 2020-07-14 PROCEDURE — 99396 PREV VISIT EST AGE 40-64: CPT | Performed by: INTERNAL MEDICINE

## 2020-07-14 PROCEDURE — 36415 COLL VENOUS BLD VENIPUNCTURE: CPT

## 2020-07-14 PROCEDURE — 80053 COMPREHEN METABOLIC PANEL: CPT

## 2020-07-14 PROCEDURE — 3008F BODY MASS INDEX DOCD: CPT | Performed by: INTERNAL MEDICINE

## 2020-07-14 PROCEDURE — 85025 COMPLETE CBC W/AUTO DIFF WBC: CPT

## 2020-07-14 PROCEDURE — 3077F SYST BP >= 140 MM HG: CPT | Performed by: INTERNAL MEDICINE

## 2020-07-14 PROCEDURE — 3079F DIAST BP 80-89 MM HG: CPT | Performed by: INTERNAL MEDICINE

## 2020-07-14 PROCEDURE — 84443 ASSAY THYROID STIM HORMONE: CPT

## 2020-07-14 RX ORDER — ESCITALOPRAM OXALATE 10 MG/1
10 TABLET ORAL DAILY
Qty: 90 TABLET | Refills: 1 | Status: SHIPPED | OUTPATIENT
Start: 2020-07-14 | End: 2021-01-16

## 2020-07-14 RX ORDER — NADOLOL 20 MG/1
20 TABLET ORAL
Qty: 90 TABLET | Refills: 1 | Status: SHIPPED | OUTPATIENT
Start: 2020-07-14 | End: 2020-08-02

## 2020-07-14 RX ORDER — DOXEPIN HYDROCHLORIDE 10 MG/1
CAPSULE ORAL
Qty: 90 CAPSULE | Refills: 1 | Status: SHIPPED | OUTPATIENT
Start: 2020-07-14 | End: 2021-01-11

## 2020-07-14 RX ORDER — ALBUTEROL SULFATE 90 UG/1
2 AEROSOL, METERED RESPIRATORY (INHALATION) EVERY 4 HOURS PRN
Qty: 1 INHALER | Refills: 2 | Status: SHIPPED | OUTPATIENT
Start: 2020-07-14

## 2020-07-14 NOTE — PROGRESS NOTES
HPI:    Patient ID: Danielle Parkinson is a 63year old female. Presents for physical exam.   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 She has no concerns.   Takes medications as prescribed, to BY MOUTH NIGHTLY 90 tablet 1     Allergies:No Known Allergies   /85 (BP Location: Left arm, Patient Position: Sitting, Cuff Size: adult)   Pulse 58   Temp 98.5 °F (36.9 °C) (Oral)   Resp 16   Ht 5' 6\" (1.676 m)   Wt 145 lb (65.8 kg)   BMI 23.40 kg/m needed  Hyperlipidemia continue low cholesterol diet continue atorvastatin adjust medication if needed after blood   test results available.    Anxiety controlled on Lexapro  Follow-up in6 months  Orders Placed This Encounter      CBC W Differential W Plate

## 2020-07-16 ENCOUNTER — PATIENT MESSAGE (OUTPATIENT)
Dept: INTERNAL MEDICINE CLINIC | Facility: CLINIC | Age: 55
End: 2020-07-16

## 2020-07-16 NOTE — TELEPHONE ENCOUNTER
From: Claudette Dominguez  To: Giorgio Davison MD  Sent: 7/16/2020 7:47 AM CDT  Subject: Test Results Question    Good morning Dr. Stefani Kenny I received my blood results but to me it's just a bunch of numbers so can you tell me how I'm doing?  Thank you

## 2020-07-18 PROBLEM — Z85.850 HISTORY OF THYROID CANCER: Status: ACTIVE | Noted: 2020-07-18

## 2020-07-20 ENCOUNTER — TELEPHONE (OUTPATIENT)
Dept: INTERNAL MEDICINE CLINIC | Facility: CLINIC | Age: 55
End: 2020-07-20

## 2020-07-20 DIAGNOSIS — E78.00 PURE HYPERCHOLESTEROLEMIA: ICD-10-CM

## 2020-07-20 DIAGNOSIS — E03.8 OTHER SPECIFIED HYPOTHYROIDISM: Primary | ICD-10-CM

## 2020-08-04 RX ORDER — NADOLOL 20 MG/1
20 TABLET ORAL
Qty: 90 TABLET | Refills: 1 | Status: SHIPPED | OUTPATIENT
Start: 2020-08-04 | End: 2021-01-16

## 2020-08-12 ENCOUNTER — NURSE TRIAGE (OUTPATIENT)
Dept: INTERNAL MEDICINE CLINIC | Facility: CLINIC | Age: 55
End: 2020-08-12

## 2020-08-12 NOTE — TELEPHONE ENCOUNTER
----- Message from [address] [place] [address].  Dane sent at 8/11/2020  7:00 PM CDT -----  Regarding: Non-Urgent Medical Question  Contact: [phone]  Suraj Doctor,  I was making cookies   The other day at home and Accidentally hit the oven door and burned my arm I was just

## 2020-08-13 RX ORDER — LEVOTHYROXINE SODIUM 0.1 MG/1
TABLET ORAL
Qty: 30 TABLET | Refills: 0 | Status: SHIPPED | OUTPATIENT
Start: 2020-08-13 | End: 2020-09-15

## 2020-08-14 NOTE — TELEPHONE ENCOUNTER
Action Requested: Summary for Provider     []  Critical Lab, Recommendations Needed  [x] Need Additional Advice  []   FYI    []   Need Orders  [] Need Medications Sent to Pharmacy  []  Other     SUMMARY: Patient burnt her right arm on the cookie sheet abou

## 2020-08-22 ENCOUNTER — HOSPITAL ENCOUNTER (OUTPATIENT)
Dept: MAMMOGRAPHY | Age: 55
Discharge: HOME | End: 2020-08-22
Attending: INTERNAL MEDICINE
Payer: COMMERCIAL

## 2020-08-22 DIAGNOSIS — Z12.31 BREAST CANCER SCREENING BY MAMMOGRAM: ICD-10-CM

## 2020-08-22 DIAGNOSIS — R92.2 DENSE BREAST TISSUE ON MAMMOGRAM: ICD-10-CM

## 2020-08-22 PROCEDURE — 77063 BREAST TOMOSYNTHESIS BI: CPT | Performed by: INTERNAL MEDICINE

## 2020-08-22 PROCEDURE — 77067 SCR MAMMO BI INCL CAD: CPT | Performed by: INTERNAL MEDICINE

## 2020-09-15 RX ORDER — LEVOTHYROXINE SODIUM 0.1 MG/1
TABLET ORAL
Qty: 30 TABLET | Refills: 5 | Status: SHIPPED | OUTPATIENT
Start: 2020-09-15 | End: 2021-03-18

## 2020-10-27 RX ORDER — ATORVASTATIN CALCIUM 20 MG/1
20 TABLET, FILM COATED ORAL NIGHTLY
Qty: 90 TABLET | Refills: 1 | Status: SHIPPED | OUTPATIENT
Start: 2020-10-27 | End: 2021-02-03

## 2020-11-25 ENCOUNTER — TELEPHONE (OUTPATIENT)
Dept: INTERNAL MEDICINE CLINIC | Facility: CLINIC | Age: 55
End: 2020-11-25

## 2020-11-25 NOTE — TELEPHONE ENCOUNTER
Pt was called no answer. Left message to call back. Per Dr Stefani Kenny pt can be seen on Friday. Ok to use res 24.

## 2020-11-25 NOTE — TELEPHONE ENCOUNTER
Patient calling reports has knee injury a few months ago from a fall  at work ;  but has now noted a \" ball/ bump  to her left knee \"     [facility]  Was released back to work, has been working since early fall.      Patient is concerned w

## 2020-11-27 ENCOUNTER — OFFICE VISIT (OUTPATIENT)
Dept: INTERNAL MEDICINE CLINIC | Facility: CLINIC | Age: 55
End: 2020-11-27
Payer: COMMERCIAL

## 2020-11-27 ENCOUNTER — LAB ENCOUNTER (OUTPATIENT)
Dept: LAB | Age: 55
End: 2020-11-27
Attending: INTERNAL MEDICINE
Payer: COMMERCIAL

## 2020-11-27 VITALS
BODY MASS INDEX: 22.5 KG/M2 | WEIGHT: 140 LBS | SYSTOLIC BLOOD PRESSURE: 154 MMHG | HEIGHT: 66 IN | HEART RATE: 64 BPM | DIASTOLIC BLOOD PRESSURE: 84 MMHG

## 2020-11-27 DIAGNOSIS — E03.8 OTHER SPECIFIED HYPOTHYROIDISM: ICD-10-CM

## 2020-11-27 DIAGNOSIS — M70.42 PREPATELLAR BURSITIS OF LEFT KNEE: Primary | ICD-10-CM

## 2020-11-27 DIAGNOSIS — E89.0 POSTSURGICAL HYPOTHYROIDISM: ICD-10-CM

## 2020-11-27 DIAGNOSIS — E78.00 PURE HYPERCHOLESTEROLEMIA: ICD-10-CM

## 2020-11-27 PROCEDURE — 3008F BODY MASS INDEX DOCD: CPT | Performed by: INTERNAL MEDICINE

## 2020-11-27 PROCEDURE — 80061 LIPID PANEL: CPT

## 2020-11-27 PROCEDURE — 80076 HEPATIC FUNCTION PANEL: CPT

## 2020-11-27 PROCEDURE — 99214 OFFICE O/P EST MOD 30 MIN: CPT | Performed by: INTERNAL MEDICINE

## 2020-11-27 PROCEDURE — 3079F DIAST BP 80-89 MM HG: CPT | Performed by: INTERNAL MEDICINE

## 2020-11-27 PROCEDURE — 36415 COLL VENOUS BLD VENIPUNCTURE: CPT

## 2020-11-27 PROCEDURE — 84443 ASSAY THYROID STIM HORMONE: CPT

## 2020-11-27 PROCEDURE — 99212 OFFICE O/P EST SF 10 MIN: CPT | Performed by: INTERNAL MEDICINE

## 2020-11-27 PROCEDURE — 3077F SYST BP >= 140 MM HG: CPT | Performed by: INTERNAL MEDICINE

## 2020-11-27 NOTE — PROGRESS NOTES
HPI:    Patient ID: Bryce Barnett is a 63year old female. 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 He had bruising and swelling over the patella.   She w • Doxepin HCl 10 MG Oral Cap TAKE 1 CAPSULE BY MOUTH NIGHTLY 90 capsule 1   • atorvastatin 10 MG Oral Tab TAKE 1 TABLET BY MOUTH NIGHTLY 90 tablet 1     Allergies:No Known Allergies   /84 (BP Location: Right arm, Patient Position: Sitting, Cuff Size:

## 2020-12-11 ENCOUNTER — PATIENT MESSAGE (OUTPATIENT)
Dept: INTERNAL MEDICINE CLINIC | Facility: CLINIC | Age: 55
End: 2020-12-11

## 2020-12-11 NOTE — TELEPHONE ENCOUNTER
To: Martinez Gillis      From: Pam Love RN      Created: 12/11/2020 8:42 AM        Dedrick Smith, Do you mean the covid vaccine from [facility]?  Thank you, Klarissa CARRILLO

## 2020-12-11 NOTE — TELEPHONE ENCOUNTER
From: Jenni Miller  To: Ulisses Fenton MD  Sent: 12/11/2020 5:09 AM CST  Subject: Other    Good morning Doctor Nicole Love, I wanted to ask you is it safe for me  To get the COVID test New drug from [facility]? ?

## 2020-12-12 NOTE — TELEPHONE ENCOUNTER
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 Advised that vaccination required 2 injections.   She states that she is not sure when it is going to be available to her but she feels better about this

## 2020-12-12 NOTE — TELEPHONE ENCOUNTER
Patient asking if she should receive a covid vaccine. [phone]    Please call her when u are available.     Thank you

## 2021-01-11 RX ORDER — DOXEPIN HYDROCHLORIDE 10 MG/1
CAPSULE ORAL
Qty: 90 CAPSULE | Refills: 1 | Status: SHIPPED | OUTPATIENT
Start: 2021-01-11 | End: 2022-05-24

## 2021-01-16 ENCOUNTER — PATIENT MESSAGE (OUTPATIENT)
Dept: INTERNAL MEDICINE CLINIC | Facility: CLINIC | Age: 56
End: 2021-01-16

## 2021-01-16 RX ORDER — ESCITALOPRAM OXALATE 10 MG/1
10 TABLET ORAL DAILY
Qty: 90 TABLET | Refills: 1 | Status: SHIPPED | OUTPATIENT
Start: 2021-01-16 | End: 2021-01-16

## 2021-01-16 NOTE — TELEPHONE ENCOUNTER
From: Alyssa Woods  To:  German Tovar MD  Sent: 1/16/2021 6:25 AM CST  Subject: Prescription Question    Good morning Doctor Dianne Chavez, I'm getting the Rodriguez Peter test done this Tuesday at work from [employer] [address] also I need refills on my prescriptions thank you

## 2021-01-16 NOTE — TELEPHONE ENCOUNTER
To: Paola Reyna      From: Lalo Gannon RN      Created: 1/16/2021 9:12 AM        Dedrick Winkler Fearing, Please let us know the results of your test. Also on your medication refills, please request medications by name.  In reviewing your medication list, some of

## 2021-01-17 RX ORDER — NADOLOL 20 MG/1
20 TABLET ORAL
Qty: 90 TABLET | Refills: 1 | Status: SHIPPED | OUTPATIENT
Start: 2021-01-17 | End: 2022-01-18

## 2021-01-17 RX ORDER — ESCITALOPRAM OXALATE 10 MG/1
10 TABLET ORAL DAILY
Qty: 90 TABLET | Refills: 1 | Status: SHIPPED | OUTPATIENT
Start: 2021-01-17 | End: 2022-01-18

## 2021-02-04 RX ORDER — ATORVASTATIN CALCIUM 20 MG/1
20 TABLET, FILM COATED ORAL NIGHTLY
Qty: 90 TABLET | Refills: 1 | Status: SHIPPED | OUTPATIENT
Start: 2021-02-04 | End: 2021-12-17

## 2021-03-18 RX ORDER — LEVOTHYROXINE SODIUM 0.1 MG/1
TABLET ORAL
Qty: 30 TABLET | Refills: 0 | Status: SHIPPED | OUTPATIENT
Start: 2021-03-18 | End: 2021-04-17

## 2021-04-17 ENCOUNTER — TELEPHONE (OUTPATIENT)
Dept: INTERNAL MEDICINE CLINIC | Facility: CLINIC | Age: 56
End: 2021-04-17

## 2021-04-17 RX ORDER — LEVOTHYROXINE SODIUM 0.1 MG/1
100 TABLET ORAL DAILY
Qty: 30 TABLET | Refills: 7 | Status: SHIPPED | OUTPATIENT
Start: 2021-04-17 | End: 2022-01-18

## 2021-04-17 RX ORDER — LEVOTHYROXINE SODIUM 0.1 MG/1
TABLET ORAL
Qty: 30 TABLET | Refills: 0 | Status: SHIPPED | OUTPATIENT
Start: 2021-04-17 | End: 2021-04-17

## 2021-05-16 ENCOUNTER — PATIENT MESSAGE (OUTPATIENT)
Dept: INTERNAL MEDICINE CLINIC | Facility: CLINIC | Age: 56
End: 2021-05-16

## 2021-05-16 NOTE — TELEPHONE ENCOUNTER
Please advise on Pt's message. Thanks      From: Martinez Gillis  To:  Catarina Kilgore MD  Sent: 5/16/2021  7:27 AM CDT  Subject: Prescription Question    Good morning Doctor Bradley Jessica, I just  my Prescription for doxepin 10 mg but lately at work & menopause

## 2021-05-27 ENCOUNTER — PATIENT MESSAGE (OUTPATIENT)
Dept: INTERNAL MEDICINE CLINIC | Facility: CLINIC | Age: 56
End: 2021-05-27

## 2021-05-27 RX ORDER — DOXEPIN HYDROCHLORIDE 10 MG/1
10 CAPSULE ORAL NIGHTLY
Qty: 90 CAPSULE | Refills: 0 | Status: SHIPPED | OUTPATIENT
Start: 2021-05-27 | End: 2021-08-11

## 2021-05-27 NOTE — TELEPHONE ENCOUNTER
From: Shaquille Belcher  To:  Jame Dangelo MD  Sent: 5/27/2021 5:46 AM CDT  Subject: Prescription Question    Good morning Doctor, I did try the 25 mg of doxepin a few times and it made me extremely tired the next day and almost non functional. can I please go b

## 2021-07-28 ENCOUNTER — TELEPHONE (OUTPATIENT)
Dept: INTERNAL MEDICINE CLINIC | Facility: CLINIC | Age: 56
End: 2021-07-28

## 2021-07-28 NOTE — TELEPHONE ENCOUNTER
Patient called and is asking for a call back today (any time is OK per patient). She needs a letter for her [address] stating she can keep her dog as an emotional support animal. If she does not get this letter they apartment complex will make her get rid of the dog she just bought.        Please Advise

## 2021-08-01 ENCOUNTER — TELEPHONE (OUTPATIENT)
Dept: INTERNAL MEDICINE CLINIC | Facility: CLINIC | Age: 56
End: 2021-08-01

## 2021-08-11 RX ORDER — DOXEPIN HYDROCHLORIDE 10 MG/1
10 CAPSULE ORAL NIGHTLY
Qty: 90 CAPSULE | Refills: 1 | Status: SHIPPED | OUTPATIENT
Start: 2021-08-11 | End: 2022-01-18

## 2021-08-12 NOTE — TELEPHONE ENCOUNTER
Please review. Protocol failed/ No protocol      Requested Prescriptions   Pending Prescriptions Disp Refills    DOXEPIN 10 MG Oral Cap [Pharmacy Med Name: Doxepin Hcl 10 Mg Cap Marilyn] 90 capsule 0     Sig: TAKE 1 CAPSULE BY MOUTH NIGHTLY        Psychiatric Non-Scheduled (Anti-Anxiety) Passed - 8/11/2021  7:39 PM        Passed - Appointment in last 6 or next 3 months              Future Appointments         Provider Department Appt Notes    In 2 days Zee Calix MD [facility], [facility], [facility] [place], [place]              Recent Outpatient Visits              8 months ago Prepatellar bursitis of left knee    [facility], [facility],  Akash [place], [place]    Office Visit    1 year ago Physical exam, annual    Akash Lowery [place], [place]    Office Visit    2 years ago Strain of lumbar region, subsequent encounter    [facility] [place]    Office Visit    2 years ago Spasm of muscle of lower back    [address]    Office Visit    2 years ago Bronchitis with bronchospasm    [facility] [place] [place]    Office Visit

## 2021-08-13 ENCOUNTER — OFFICE VISIT (OUTPATIENT)
Dept: INTERNAL MEDICINE CLINIC | Facility: CLINIC | Age: 56
End: 2021-08-13
Payer: COMMERCIAL

## 2021-08-13 VITALS
SYSTOLIC BLOOD PRESSURE: 140 MMHG | HEIGHT: 66 IN | DIASTOLIC BLOOD PRESSURE: 70 MMHG | HEART RATE: 73 BPM | WEIGHT: 131.19 LBS | BODY MASS INDEX: 21.08 KG/M2

## 2021-08-13 DIAGNOSIS — E55.9 VITAMIN D DEFICIENCY: ICD-10-CM

## 2021-08-13 DIAGNOSIS — Z12.31 BREAST CANCER SCREENING BY MAMMOGRAM: ICD-10-CM

## 2021-08-13 DIAGNOSIS — R63.4 WEIGHT LOSS, NON-INTENTIONAL: ICD-10-CM

## 2021-08-13 DIAGNOSIS — Z85.850 HISTORY OF THYROID CANCER: ICD-10-CM

## 2021-08-13 DIAGNOSIS — Z00.00 PHYSICAL EXAM, ANNUAL: Primary | ICD-10-CM

## 2021-08-13 PROCEDURE — 3008F BODY MASS INDEX DOCD: CPT | Performed by: INTERNAL MEDICINE

## 2021-08-13 PROCEDURE — 3077F SYST BP >= 140 MM HG: CPT | Performed by: INTERNAL MEDICINE

## 2021-08-13 PROCEDURE — 3078F DIAST BP <80 MM HG: CPT | Performed by: INTERNAL MEDICINE

## 2021-08-13 PROCEDURE — 99396 PREV VISIT EST AGE 40-64: CPT | Performed by: INTERNAL MEDICINE

## 2021-08-14 ENCOUNTER — LAB ENCOUNTER (OUTPATIENT)
Dept: LAB | Age: 56
End: 2021-08-14
Attending: INTERNAL MEDICINE
Payer: COMMERCIAL

## 2021-08-14 DIAGNOSIS — Z00.00 PHYSICAL EXAM, ANNUAL: ICD-10-CM

## 2021-08-14 DIAGNOSIS — D64.89 ANEMIA DUE TO OTHER CAUSE, NOT CLASSIFIED: ICD-10-CM

## 2021-08-14 DIAGNOSIS — E55.9 VITAMIN D DEFICIENCY: ICD-10-CM

## 2021-08-14 DIAGNOSIS — R73.9 HYPERGLYCEMIA: ICD-10-CM

## 2021-08-14 LAB
ALBUMIN SERPL-MCNC: 4.1 G/DL (ref 3.4–5)
ALBUMIN/GLOB SERPL: 1.1 {RATIO} (ref 1–2)
ALP LIVER SERPL-CCNC: 58 U/L
ALT SERPL-CCNC: 32 U/L
ANION GAP SERPL CALC-SCNC: 4 MMOL/L (ref 0–18)
AST SERPL-CCNC: 25 U/L (ref 15–37)
BASOPHILS # BLD AUTO: 0.08 X10(3) UL (ref 0–0.2)
BASOPHILS NFR BLD AUTO: 1.5 %
BILIRUB SERPL-MCNC: 0.4 MG/DL (ref 0.1–2)
BUN BLD-MCNC: 13 MG/DL (ref 7–18)
BUN/CREAT SERPL: 17.8 (ref 10–20)
CALCIUM BLD-MCNC: 9.1 MG/DL (ref 8.5–10.1)
CHLORIDE SERPL-SCNC: 106 MMOL/L (ref 98–112)
CHOLEST SMN-MCNC: 185 MG/DL (ref ?–200)
CO2 SERPL-SCNC: 31 MMOL/L (ref 21–32)
CREAT BLD-MCNC: 0.73 MG/DL
DEPRECATED HBV CORE AB SER IA-ACNC: 143.4 NG/ML
DEPRECATED RDW RBC AUTO: 40.4 FL (ref 35.1–46.3)
EOSINOPHIL # BLD AUTO: 0.33 X10(3) UL (ref 0–0.7)
EOSINOPHIL NFR BLD AUTO: 6.3 %
ERYTHROCYTE [DISTWIDTH] IN BLOOD BY AUTOMATED COUNT: 13 % (ref 11–15)
EST. AVERAGE GLUCOSE BLD GHB EST-MCNC: 131 MG/DL (ref 68–126)
GLOBULIN PLAS-MCNC: 3.7 G/DL (ref 2.8–4.4)
GLUCOSE BLD-MCNC: 121 MG/DL (ref 70–99)
HBA1C MFR BLD HPLC: 6.2 % (ref ?–5.7)
HCT VFR BLD AUTO: 38.6 %
HDLC SERPL-MCNC: 48 MG/DL (ref 40–59)
HGB BLD-MCNC: 11.8 G/DL
IMM GRANULOCYTES # BLD AUTO: 0.01 X10(3) UL (ref 0–1)
IMM GRANULOCYTES NFR BLD: 0.2 %
IRON SATURATION: 17 %
IRON SERPL-MCNC: 66 UG/DL
LDLC SERPL CALC-MCNC: 122 MG/DL (ref ?–100)
LYMPHOCYTES # BLD AUTO: 1.4 X10(3) UL (ref 1–4)
LYMPHOCYTES NFR BLD AUTO: 26.7 %
M PROTEIN MFR SERPL ELPH: 7.8 G/DL (ref 6.4–8.2)
MCH RBC QN AUTO: 26.2 PG (ref 26–34)
MCHC RBC AUTO-ENTMCNC: 30.6 G/DL (ref 31–37)
MCV RBC AUTO: 85.6 FL
MONOCYTES # BLD AUTO: 0.52 X10(3) UL (ref 0.1–1)
MONOCYTES NFR BLD AUTO: 9.9 %
NEUTROPHILS # BLD AUTO: 2.9 X10 (3) UL (ref 1.5–7.7)
NEUTROPHILS # BLD AUTO: 2.9 X10(3) UL (ref 1.5–7.7)
NEUTROPHILS NFR BLD AUTO: 55.4 %
NONHDLC SERPL-MCNC: 137 MG/DL (ref ?–130)
OSMOLALITY SERPL CALC.SUM OF ELEC: 293 MOSM/KG (ref 275–295)
PATIENT FASTING Y/N/NP: YES
PATIENT FASTING Y/N/NP: YES
PLATELET # BLD AUTO: 236 10(3)UL (ref 150–450)
POTASSIUM SERPL-SCNC: 4.9 MMOL/L (ref 3.5–5.1)
RBC # BLD AUTO: 4.51 X10(6)UL
SODIUM SERPL-SCNC: 141 MMOL/L (ref 136–145)
TOTAL IRON BINDING CAPACITY: 378 UG/DL (ref 240–450)
TRANSFERRIN SERPL-MCNC: 254 MG/DL (ref 200–360)
TRIGL SERPL-MCNC: 79 MG/DL (ref 30–149)
TSI SER-ACNC: 0.12 MIU/ML (ref 0.36–3.74)
VIT B12 SERPL-MCNC: 548 PG/ML (ref 193–986)
VIT D+METAB SERPL-MCNC: 24.8 NG/ML (ref 30–100)
VLDLC SERPL CALC-MCNC: 14 MG/DL (ref 0–30)
WBC # BLD AUTO: 5.2 X10(3) UL (ref 4–11)

## 2021-08-14 PROCEDURE — 84443 ASSAY THYROID STIM HORMONE: CPT

## 2021-08-14 PROCEDURE — 80061 LIPID PANEL: CPT

## 2021-08-14 PROCEDURE — 82306 VITAMIN D 25 HYDROXY: CPT

## 2021-08-14 PROCEDURE — 85025 COMPLETE CBC W/AUTO DIFF WBC: CPT

## 2021-08-14 PROCEDURE — 82607 VITAMIN B-12: CPT

## 2021-08-14 PROCEDURE — 83540 ASSAY OF IRON: CPT

## 2021-08-14 PROCEDURE — 80053 COMPREHEN METABOLIC PANEL: CPT

## 2021-08-14 PROCEDURE — 83036 HEMOGLOBIN GLYCOSYLATED A1C: CPT

## 2021-08-14 PROCEDURE — 84466 ASSAY OF TRANSFERRIN: CPT

## 2021-08-14 PROCEDURE — 84432 ASSAY OF THYROGLOBULIN: CPT | Performed by: INTERNAL MEDICINE

## 2021-08-14 PROCEDURE — 82728 ASSAY OF FERRITIN: CPT

## 2021-08-14 PROCEDURE — 36415 COLL VENOUS BLD VENIPUNCTURE: CPT

## 2021-08-14 PROCEDURE — 86800 THYROGLOBULIN ANTIBODY: CPT | Performed by: INTERNAL MEDICINE

## 2021-08-15 NOTE — PROGRESS NOTES
HPI/Subjective:   Patient ID: Lesley Arias is a 61year old female.   Presents for physical exam, follow-up of multiple conditions  HPI  Patient reports that she has been feeling well overall, lately noticed that her appetite is down, she is not eating as mu atorvastatin 20 MG Oral Tab Take 1 tablet (20 mg total) by mouth nightly.  (Patient not taking: Reported on 8/13/2021 ) 90 tablet 1   • Doxepin HCl 10 MG Oral Cap TAKE 1 CAPSULE BY MOUTH NIGHTLY (Patient not taking: Reported on 8/13/2021 ) 90 capsule 1 consume adequate amount of calories, snacking during the day  Anxiety controlled on current medication  Essential hypertension uncontrolled today due to whitecoat hypertension patient will monitor blood pressure at home and return if it stays elevated  Mary Ann Worrell

## 2021-08-17 LAB
THYROGLOBULIN ANTIBODY: <1.8 IU/ML
THYROGLOBULIN TUMOR MARKER: <0.1 NG/ML

## 2021-08-28 ENCOUNTER — TELEPHONE (OUTPATIENT)
Dept: INTERNAL MEDICINE CLINIC | Facility: CLINIC | Age: 56
End: 2021-08-28

## 2021-08-28 ENCOUNTER — HOSPITAL ENCOUNTER (OUTPATIENT)
Dept: MAMMOGRAPHY | Age: 56
Discharge: HOME | End: 2021-08-28
Attending: INTERNAL MEDICINE
Payer: COMMERCIAL

## 2021-08-28 DIAGNOSIS — Z12.31 BREAST CANCER SCREENING BY MAMMOGRAM: ICD-10-CM

## 2021-08-28 PROCEDURE — 77067 SCR MAMMO BI INCL CAD: CPT | Performed by: INTERNAL MEDICINE

## 2021-08-28 PROCEDURE — 77063 BREAST TOMOSYNTHESIS BI: CPT | Performed by: INTERNAL MEDICINE

## 2021-08-28 NOTE — TELEPHONE ENCOUNTER
Spoke to patient, reviewed recent blood test results, mild anemia all other markers are negative, slight elevated LDL, hemoglobin A1c 6.2 in prediabetic range.   Suppressed TSH, she will follow low carbohydrate diet eliminated sugars, watching regular food

## 2021-08-31 ENCOUNTER — TELEMEDICINE (OUTPATIENT)
Dept: INTERNAL MEDICINE CLINIC | Facility: CLINIC | Age: 56
End: 2021-08-31

## 2021-08-31 ENCOUNTER — TELEPHONE (OUTPATIENT)
Dept: INTERNAL MEDICINE CLINIC | Facility: CLINIC | Age: 56
End: 2021-08-31

## 2021-08-31 ENCOUNTER — LAB ENCOUNTER (OUTPATIENT)
Dept: LAB | Facility: HOSPITAL | Age: 56
End: 2021-08-31
Attending: NURSE PRACTITIONER
Payer: COMMERCIAL

## 2021-08-31 DIAGNOSIS — Z20.822 SUSPECTED 2019 NOVEL CORONAVIRUS INFECTION: ICD-10-CM

## 2021-08-31 DIAGNOSIS — R11.0 NAUSEA: Primary | ICD-10-CM

## 2021-08-31 PROCEDURE — 99442 PHONE E/M BY PHYS 11-20 MIN: CPT | Performed by: NURSE PRACTITIONER

## 2021-08-31 NOTE — PROGRESS NOTES
Virtual Telephone Check-In    Lesley Arias verbally consents to a Virtual/Telephone Check-In visit on 08/31/21. Patient understands and accepts financial responsibility for any deductible, co-insurance and/or co-pays associated with this service.     Al Castillo needed for Wheezing., Disp: 1 Inhaler, Rfl: 2  •  atorvastatin 10 MG Oral Tab, TAKE 1 TABLET BY MOUTH NIGHTLY, Disp: 90 tablet, Rfl: 1    Summary of topics discussed: Antonette TUBBS. SHAWN, spoke with pt. Pt of Dr. Queta Erickson.  Pt presents w/ c/o intermittent 08/14/2021 08:20 AM    ALT 32 08/14/2021 08:20 AM    TSH 0.118 (L) 08/14/2021 08:20 AM    T4F 1.08 07/23/2018 10:43 AM        Lab Results   Component Value Date/Time    CHOLEST 185 08/14/2021 08:20 AM    HDL 48 08/14/2021 08:20 AM    TRIG 79 08/14/2021 08: This has been done in good harley to provide continuity of care in the best interest of the patient provider relationship, due to ongoing public health crisis, National emergency and because of restrictions of visitation.   There are limitations visit as no

## 2021-09-01 ENCOUNTER — PATIENT MESSAGE (OUTPATIENT)
Dept: INTERNAL MEDICINE CLINIC | Facility: CLINIC | Age: 56
End: 2021-09-01

## 2021-09-01 LAB — SARS-COV-2 RNA RESP QL NAA+PROBE: NOT DETECTED

## 2021-09-01 NOTE — TELEPHONE ENCOUNTER
From: Esther Puga  To: SHAWN Zarate  Sent: 9/1/2021 4:43 PM CDT  Subject: Other    Can I please have a letter saying that it's OK to go back to work on Monday Sept 6 I have stop the [employer] as well feeling a little better today.  Thank you M

## 2021-09-02 ENCOUNTER — TELEPHONE (OUTPATIENT)
Dept: INTERNAL MEDICINE CLINIC | Facility: CLINIC | Age: 56
End: 2021-09-02

## 2021-09-02 NOTE — TELEPHONE ENCOUNTER
Regarding: Other  Contact: [phone]  ----- Message from Koko Irving sent at 9/2/2021  8:30 AM CDT -----       ----- Message from Gladys Bynum to SHAWN Hall sent at 9/1/2021  4:43 PM -----   Can I please have a letter saying that it's OK to

## 2021-09-02 NOTE — TELEPHONE ENCOUNTER
Patient checking status on getting a note to return to work on Monday, 9/6 due to Beronica test was negative, please advise

## 2021-10-20 ENCOUNTER — TELEPHONE (OUTPATIENT)
Dept: INTERNAL MEDICINE CLINIC | Facility: CLINIC | Age: 56
End: 2021-10-20

## 2021-10-21 NOTE — PROGRESS NOTES
Virtual Telephone Check-In    Rosie Mixon verbally consents to a Virtual/Telephone Check-In visit on 10/20/2021      Patient understands and accepts financial responsibility for any deductible, co-insurance and/or co-pays associated with this service.     Mars Doyle • atorvastatin 20 MG Oral Tab Take 1 tablet (20 mg total) by mouth nightly. (Patient not taking: Reported on 8/13/2021 ) 90 tablet 1   • nadolol 20 MG Oral Tab Take 1 tablet (20 mg total) by mouth once daily.  90 tablet 1   • escitalopram 10 MG Oral Tab T

## 2021-12-17 RX ORDER — ATORVASTATIN CALCIUM 20 MG/1
20 TABLET, FILM COATED ORAL NIGHTLY
Qty: 90 TABLET | Refills: 1 | Status: SHIPPED | OUTPATIENT
Start: 2021-12-17 | End: 2022-08-02

## 2021-12-17 NOTE — TELEPHONE ENCOUNTER
Refill passed per [facility] protocol. Requested Prescriptions   Pending Prescriptions Disp Refills    ATORVASTATIN 20 MG Oral Tab [Pharmacy Med Name: Atorvastatin Calcium 20 Mg Tab Nort] 90 tablet 0     Sig: Take 1 tablet (20 mg total) by mouth nightly.         Cholesterol Medication Protocol Passed - 12/17/2021  5:30 AM        Passed - ALT in past 12 months        Passed - LDL in past 12 months        Passed - Last ALT < 80       Lab Results   Component Value Date    ALT 32 08/14/2021             Passed - Last LDL < 130     Lab Results   Component Value Date     (H) 08/14/2021               Passed - Appointment in past 12 or next 3 months                   Recent Outpatient Visits              1 month ago Major depressive disorder with single episode, in partial remission [facility])    [facility], Steffi [place], [place]    Telemedicine    3 months ago Nausea    [facility], [address], Elisabeth Cardona, APRN    Telemedicine    4 months ago Physical exam, annual    [facility] [place]    Office Visit    1 year ago Prepatellar bursitis of left knee    Steffi Addison [place], [place]    Office Visit    1 year ago Physical exam, annual    [facility], Ute Burns MD    Office Visit

## 2022-01-18 RX ORDER — ATORVASTATIN CALCIUM 20 MG/1
20 TABLET, FILM COATED ORAL NIGHTLY
Qty: 90 TABLET | Refills: 1 | Status: CANCELLED | OUTPATIENT
Start: 2022-01-18

## 2022-01-21 RX ORDER — NADOLOL 20 MG/1
20 TABLET ORAL
Qty: 90 TABLET | Refills: 1 | Status: SHIPPED | OUTPATIENT
Start: 2022-01-21

## 2022-01-21 RX ORDER — DOXEPIN HYDROCHLORIDE 10 MG/1
10 CAPSULE ORAL NIGHTLY
Qty: 90 CAPSULE | Refills: 1 | Status: SHIPPED | OUTPATIENT
Start: 2022-01-21

## 2022-01-21 RX ORDER — LEVOTHYROXINE SODIUM 0.1 MG/1
100 TABLET ORAL DAILY
Qty: 30 TABLET | Refills: 7 | Status: SHIPPED | OUTPATIENT
Start: 2022-01-21

## 2022-01-21 RX ORDER — ESCITALOPRAM OXALATE 10 MG/1
15 TABLET ORAL DAILY
Qty: 90 TABLET | Refills: 1 | Status: SHIPPED | OUTPATIENT
Start: 2022-01-21

## 2022-02-18 ENCOUNTER — NURSE TRIAGE (OUTPATIENT)
Dept: INTERNAL MEDICINE CLINIC | Facility: CLINIC | Age: 57
End: 2022-02-18

## 2022-02-18 NOTE — TELEPHONE ENCOUNTER
Patient returned call, confirms she wants to see GI with Amee [facility]z as its closer to home. [facility] does not have GI dept, providers are from [address] [place] [address] Gastroenterology, procedures only are done at [facility]. Referral pended for GI provider that works with Edgar Thorpe.      Also please confirm if patient needs appt with PCP office or only GI

## 2022-02-18 NOTE — TELEPHONE ENCOUNTER
Patient does not need to be seen immediately she had only one episode, she knows to make appointment if she has repeated rectal bleeding.   Referral signed

## 2022-02-18 NOTE — TELEPHONE ENCOUNTER
lmtcb Please transfer to ext [phone]. I need to talk to pt again.  pt was called and informed of your message below. Pt then wanted to know where these doctors practice pt was informed they are in [address] stated that she will like to see someone that is at [facility] so she can have her Colonoscopy done at [facility] as it is close to her house. Also Dr. Chen Bender you want pt to be seen today by on of your partners correct?

## 2022-02-18 NOTE — TELEPHONE ENCOUNTER
Left message to call back. Please transfer to triage. 1st attempt.   Please see  message below    Referred to Provider Information:  Provider Address Phone   Blossom Astorga MD [address] [place] [phone]

## 2022-02-18 NOTE — TELEPHONE ENCOUNTER
Please provide pt with GI OFFICE NUMBER [phone]  DR Downing/ Cj/ Lexa/ Janette Blake / Christie Jay  or PA/NP at the  same  d[artment  to get in if cannot  get in soon , needs to be seen by an available provider at the clinic to start evalaution

## 2022-02-18 NOTE — TELEPHONE ENCOUNTER
Spoke to patient, reviewed symptoms to watch for and when to go to emergency room, she had one episode of blood on wiping. Provided her with a number and name of Dr. Wendi Caballero through [facility] gastroenterology office in [place]. Advised that he is a part of the group of several gastroenterologists.

## 2022-02-26 ENCOUNTER — LAB ENCOUNTER (OUTPATIENT)
Dept: LAB | Age: 57
End: 2022-02-26
Attending: INTERNAL MEDICINE
Payer: COMMERCIAL

## 2022-02-26 DIAGNOSIS — E78.49 OTHER HYPERLIPIDEMIA: ICD-10-CM

## 2022-02-26 DIAGNOSIS — E89.0 POSTSURGICAL HYPOTHYROIDISM: ICD-10-CM

## 2022-02-26 LAB
ALBUMIN SERPL-MCNC: 4.1 G/DL (ref 3.4–5)
ALP LIVER SERPL-CCNC: 63 U/L
ALT SERPL-CCNC: 34 U/L
AST SERPL-CCNC: 26 U/L (ref 15–37)
BASOPHILS # BLD AUTO: 0.05 X10(3) UL (ref 0–0.2)
BASOPHILS NFR BLD AUTO: 0.9 %
BILIRUB DIRECT SERPL-MCNC: 0.1 MG/DL (ref 0–0.2)
BILIRUB SERPL-MCNC: 0.6 MG/DL (ref 0.1–2)
CHOLEST SERPL-MCNC: 174 MG/DL (ref ?–200)
DEPRECATED RDW RBC AUTO: 42.7 FL (ref 35.1–46.3)
EOSINOPHIL # BLD AUTO: 0.32 X10(3) UL (ref 0–0.7)
EOSINOPHIL NFR BLD AUTO: 6 %
ERYTHROCYTE [DISTWIDTH] IN BLOOD BY AUTOMATED COUNT: 13.2 % (ref 11–15)
EST. AVERAGE GLUCOSE BLD GHB EST-MCNC: 128 MG/DL (ref 68–126)
FASTING PATIENT LIPID ANSWER: YES
HBA1C MFR BLD: 6.1 % (ref ?–5.7)
HCT VFR BLD AUTO: 41.7 %
HDLC SERPL-MCNC: 48 MG/DL (ref 40–59)
HGB BLD-MCNC: 12.6 G/DL
IMM GRANULOCYTES # BLD AUTO: 0.01 X10(3) UL (ref 0–1)
IMM GRANULOCYTES NFR BLD: 0.2 %
LDLC SERPL CALC-MCNC: 108 MG/DL (ref ?–100)
LYMPHOCYTES # BLD AUTO: 1.62 X10(3) UL (ref 1–4)
LYMPHOCYTES NFR BLD AUTO: 30.2 %
MCH RBC QN AUTO: 26.5 PG (ref 26–34)
MCHC RBC AUTO-ENTMCNC: 30.2 G/DL (ref 31–37)
MCV RBC AUTO: 87.6 FL
MONOCYTES # BLD AUTO: 0.55 X10(3) UL (ref 0.1–1)
MONOCYTES NFR BLD AUTO: 10.3 %
NEUTROPHILS # BLD AUTO: 2.81 X10 (3) UL (ref 1.5–7.7)
NEUTROPHILS # BLD AUTO: 2.81 X10(3) UL (ref 1.5–7.7)
NEUTROPHILS NFR BLD AUTO: 52.4 %
NONHDLC SERPL-MCNC: 126 MG/DL (ref ?–130)
PLATELET # BLD AUTO: 226 10(3)UL (ref 150–450)
PROT SERPL-MCNC: 7.7 G/DL (ref 6.4–8.2)
RBC # BLD AUTO: 4.76 X10(6)UL
TRIGL SERPL-MCNC: 96 MG/DL (ref 30–149)
TSI SER-ACNC: 1.73 MIU/ML (ref 0.36–3.74)
VLDLC SERPL CALC-MCNC: 16 MG/DL (ref 0–30)
WBC # BLD AUTO: 5.4 X10(3) UL (ref 4–11)

## 2022-02-26 PROCEDURE — 80061 LIPID PANEL: CPT

## 2022-02-26 PROCEDURE — 83036 HEMOGLOBIN GLYCOSYLATED A1C: CPT

## 2022-02-26 PROCEDURE — 84443 ASSAY THYROID STIM HORMONE: CPT

## 2022-02-26 PROCEDURE — 80076 HEPATIC FUNCTION PANEL: CPT

## 2022-02-26 PROCEDURE — 36415 COLL VENOUS BLD VENIPUNCTURE: CPT

## 2022-02-26 PROCEDURE — 85025 COMPLETE CBC W/AUTO DIFF WBC: CPT

## 2022-04-19 ENCOUNTER — PATIENT MESSAGE (OUTPATIENT)
Dept: INTERNAL MEDICINE CLINIC | Facility: CLINIC | Age: 57
End: 2022-04-19

## 2022-05-27 ENCOUNTER — ANESTHESIA (OUTPATIENT)
Dept: ENDOSCOPY | Facility: HOSPITAL | Age: 57
End: 2022-05-27
Payer: COMMERCIAL

## 2022-05-27 ENCOUNTER — HOSPITAL ENCOUNTER (OUTPATIENT)
Facility: HOSPITAL | Age: 57
Setting detail: HOSPITAL OUTPATIENT SURGERY
Discharge: HOME | End: 2022-05-27
Attending: INTERNAL MEDICINE | Admitting: INTERNAL MEDICINE
Payer: COMMERCIAL

## 2022-05-27 ENCOUNTER — ANESTHESIA EVENT (OUTPATIENT)
Dept: ENDOSCOPY | Facility: HOSPITAL | Age: 57
End: 2022-05-27
Payer: COMMERCIAL

## 2022-05-27 VITALS
HEART RATE: 62 BPM | SYSTOLIC BLOOD PRESSURE: 189 MMHG | WEIGHT: 133.63 LBS | BODY MASS INDEX: 21.47 KG/M2 | RESPIRATION RATE: 18 BRPM | DIASTOLIC BLOOD PRESSURE: 109 MMHG | HEIGHT: 66 IN | OXYGEN SATURATION: 100 % | TEMPERATURE: 98 F

## 2022-05-27 DIAGNOSIS — Z12.11 SCREEN FOR COLON CANCER: ICD-10-CM

## 2022-05-27 PROCEDURE — 0DBM8ZX EXCISION OF DESCENDING COLON, VIA NATURAL OR ARTIFICIAL OPENING ENDOSCOPIC, DIAGNOSTIC: ICD-10-PCS | Performed by: INTERNAL MEDICINE

## 2022-05-27 PROCEDURE — 0DBN8ZX EXCISION OF SIGMOID COLON, VIA NATURAL OR ARTIFICIAL OPENING ENDOSCOPIC, DIAGNOSTIC: ICD-10-PCS | Performed by: INTERNAL MEDICINE

## 2022-05-27 PROCEDURE — 88305 TISSUE EXAM BY PATHOLOGIST: CPT | Performed by: INTERNAL MEDICINE

## 2022-05-27 RX ORDER — SODIUM CHLORIDE, SODIUM LACTATE, POTASSIUM CHLORIDE, CALCIUM CHLORIDE 600; 310; 30; 20 MG/100ML; MG/100ML; MG/100ML; MG/100ML
INJECTION, SOLUTION INTRAVENOUS CONTINUOUS
Status: DISCONTINUED | OUTPATIENT
Start: 2022-05-27 | End: 2022-05-27

## 2022-05-27 RX ORDER — LIDOCAINE HYDROCHLORIDE 10 MG/ML
INJECTION, SOLUTION EPIDURAL; INFILTRATION; INTRACAUDAL; PERINEURAL AS NEEDED
Status: DISCONTINUED | OUTPATIENT
Start: 2022-05-27 | End: 2022-05-27 | Stop reason: SURG

## 2022-05-27 RX ORDER — DEXAMETHASONE SODIUM PHOSPHATE 4 MG/ML
4 VIAL (ML) INJECTION AS NEEDED
Status: DISCONTINUED | OUTPATIENT
Start: 2022-05-27 | End: 2022-05-27

## 2022-05-27 RX ORDER — ONDANSETRON 2 MG/ML
4 INJECTION INTRAMUSCULAR; INTRAVENOUS AS NEEDED
Status: DISCONTINUED | OUTPATIENT
Start: 2022-05-27 | End: 2022-05-27

## 2022-05-27 RX ORDER — NALOXONE HYDROCHLORIDE 0.4 MG/ML
80 INJECTION, SOLUTION INTRAMUSCULAR; INTRAVENOUS; SUBCUTANEOUS AS NEEDED
Status: DISCONTINUED | OUTPATIENT
Start: 2022-05-27 | End: 2022-05-27

## 2022-05-27 RX ORDER — METOCLOPRAMIDE HYDROCHLORIDE 5 MG/ML
10 INJECTION INTRAMUSCULAR; INTRAVENOUS AS NEEDED
Status: DISCONTINUED | OUTPATIENT
Start: 2022-05-27 | End: 2022-05-27

## 2022-05-27 RX ADMIN — LIDOCAINE HYDROCHLORIDE 50 MG: 10 INJECTION, SOLUTION EPIDURAL; INFILTRATION; INTRACAUDAL; PERINEURAL at 10:10:00

## 2022-05-27 RX ADMIN — SODIUM CHLORIDE, SODIUM LACTATE, POTASSIUM CHLORIDE, CALCIUM CHLORIDE: 600; 310; 30; 20 INJECTION, SOLUTION INTRAVENOUS at 10:35:00

## 2022-05-27 NOTE — OPERATIVE REPORT
Colon operative report  Patient Name: Ciro Morse  Procedure: Colonoscopy with snare polypectomy  Date of procedure: 5/27/2022    Indication: Colon cancer screening  Date of last colonoscopy: No prior examination  Attending: Rubens Bhagat M.D. Consent: The risks, benefits, and alternatives were discussed with the patient / POA. Risks included, but were not limited to, bleeding, perforation, medication effects, cardiac arrhythmias, missed polyps, and aspiration. After all questions were answered to their satisfaction, a signed, informed, and witnessed consent was obtained. Timeout:  Prior to initiation of sedation, a formal timeout was performed, confirming the patient's name, date of birth, allergies, correct procedure, and need for antibiotics. The operating physician and sedating physician was also confirmed prior to initiation of sedation. Sedation: Monitored Anesthesia Care. Sedation: Monitored Anesthesia Care  Monitoring: Pulsoximetry, pulse, respirations, and blood pressure were monitored throughout the entire procedure    Preparation Quality: Adequate           [place] Prep Score:  Right: 3, Middle: 3, Left: 3    Total: 9  Procedure: After achieving adequate sedation, and placing the patient in the left lateral decubitus position, a digital rectal examination was performed. The lubricated tip of the pediatric colonoscope was then introduced into the rectum and advanced to the terminal ileum. The appendiceal orifice and ileocecal valve were clearly and distinctly visualized, thus verifying the cecum. The terminal ileum was intubated and found to be normal to the extent examined. The endoscope was then carefully withdrawn from the patient with careful visualization of the colonic mucosa revealing no additional pathologic findings. Air was suctioned to the best of my ability, during withdrawal of the endoscope.   When the endoscope reached the rectum, it was placed in a retroflexed position, and the rectal bulb was thus visualized. The endoscope was righted, and air was suctioned from the colon to the best of my ability, as it was during withdrawn from the colon. The endoscope was then removed from the patient. The patient tolerated the procedure without apparent procedural complications. The patient left the procedure room in stable condition for recovery. Findings: There were grade II internal hemorrhoids. There were no masses, fissures, fistulae, or external hemorrhoids. The mucosa of the colon  was normal, from the rectum to the cecum. There were no masses, ulcers, erosions, or diverticula. In the proximal descending colon, at the splenic flexure, 2 sessile polyp (2 mm - Nice I) were resected by cold snare, using the Exacto snare. In the sigmoid colon, a 5 mm sessile polyp (Nice I) was resected by cold snare, using the Exacto snare. The appendiceal orifice and ileocecal valve were both clearly and distinctly visualized, thus verifying the cecum. The terminal ileum was intubated and the terminal ileal mucosa was found to be normal to the extent examined. Impression: Findings as above. Recommendations: Follow-up pathology  Repeat Colonoscopy Indicated: 7 years if the sigmoid polyp is adenomatous. If all polyps are hyperplastic, a repeat would be recommended in 10 years.

## 2022-05-27 NOTE — ANESTHESIA POSTPROCEDURE EVALUATION
[facility]    Demetri Chavez Patient Status:  Hospital Outpatient Surgery   Age/Gender 61year old female MRN [id number]   Location [address] [address] Attending Hussein Schultz MD   Hosp Day # 0 PCP Kameron Serrano MD       Anesthesia Post-op Note    COLONOSCOPY WITH COLD SNARE POLYPECTOMY    Procedure Summary     Date: 05/27/22 Room / Location: [facility] ENDOSCOPY 02 / [facility] ENDOSCOPY    Anesthesia Start: 1001 Anesthesia Stop:     Procedure: COLONOSCOPY WITH COLD SNARE POLYPECTOMY (N/A ) Diagnosis:       Screen for colon cancer      (polyp, hemorrhoids)    Surgeons: Hussein Schultz MD Anesthesiologist: Addie Mcdonald MD    Anesthesia Type: MAC ASA Status: 2          Anesthesia Type: MAC    Vitals Value Taken Time   /92 05/27/22 1034   Temp  05/27/22 1035   Pulse 68 05/27/22 1035   Resp 18 05/27/22 1035   SpO2 100 % 05/27/22 1035   Vitals shown include unvalidated device data. Patient Location: Endoscopy    Anesthesia Type: MAC    Airway Patency: patent    Postop Pain Control: adequate    Mental Status: mildly sedated but able to meaningfully participate in the post-anesthesia evaluation    Nausea/Vomiting: none    Cardiopulmonary/Hydration status: stable euvolemic    Complications: no apparent anesthesia related complications    Postop vital signs: stable    Dental Exam: Unchanged from Preop    Patient to be discharged home when criteria met.

## 2022-07-14 RX ORDER — ESCITALOPRAM OXALATE 20 MG/1
20 TABLET ORAL DAILY
Qty: 90 TABLET | Refills: 0 | Status: SHIPPED | OUTPATIENT
Start: 2022-07-14

## 2022-07-14 RX ORDER — DOXEPIN HYDROCHLORIDE 10 MG/1
10 CAPSULE ORAL NIGHTLY
Qty: 90 CAPSULE | Refills: 0 | Status: SHIPPED | OUTPATIENT
Start: 2022-07-14

## 2022-07-25 RX ORDER — NADOLOL 20 MG/1
20 TABLET ORAL
Qty: 30 TABLET | Refills: 0 | Status: SHIPPED | OUTPATIENT
Start: 2022-07-25 | End: 2022-08-25

## 2022-08-02 RX ORDER — ATORVASTATIN CALCIUM 20 MG/1
20 TABLET, FILM COATED ORAL NIGHTLY
Qty: 90 TABLET | Refills: 0 | Status: SHIPPED | OUTPATIENT
Start: 2022-08-02

## 2022-08-25 RX ORDER — NADOLOL 20 MG/1
20 TABLET ORAL
Qty: 90 TABLET | Refills: 0 | Status: SHIPPED | OUTPATIENT
Start: 2022-08-25 | End: 2022-11-20

## 2022-08-26 NOTE — TELEPHONE ENCOUNTER
Please review. Protocol failed / No Protocol. Requested Prescriptions   Pending Prescriptions Disp Refills    NADOLOL 20 MG Oral Tab [Pharmacy Med Name: Nadolol 20 Mg Tab Exel] 30 tablet 0     Sig: Take 1 tablet (20 mg total) by mouth once daily. Hypertensive Medications Protocol Failed - 8/23/2022  7:46 PM        Failed - In person appointment in the past 12 or next 3 months       Recent Outpatient Visits              10 months ago Major depressive disorder with single episode, in partial remission [facility])    [place] [facility], [address] [place], [place]    Telemedicine    11 months ago [facility], [address], [place] Rodriguez    Telemedicine    1 year ago Physical exam, annual    Loise Canterbury, [place] Rudi Freeman, MD    Office Visit    1 year ago Prepatellar bursitis of left knee    Mir Barcenas [place], [place]    Office Visit    2 years ago Physical exam, annual    Mir Barcenas [place], [place]    Office Visit                 Failed - Last BP reading less than 140/90     BP Readings from Last 1 Encounters:  05/27/22 : (!) 189/109                Failed - CMP or BMP in past 6 months     No results found for this or any previous visit (from the past 4392 hour(s)).               Failed - In person appointment or virtual visit in the past 6 months       Recent Outpatient Visits              10 months ago Major depressive disorder with single episode, in partial remission [facility])    [facility], [address] [place], [place]    Telemedicine    11 months ago [facility], [address], [place] Rodriguez    Telemedicine    1 year ago Physical exam, annual    Lydia Cho MD    Office Visit    1 year ago Prepatellar bursitis of left knee    Mir Barcenas [place], [place]    Office Visit    2 years ago Physical exam, annual    Stu Fulton [place], [place]    Office Visit                 Failed - GFR > 50     No results found for: [facility]                     Recent Outpatient Visits              10 months ago Major depressive disorder with single episode, in partial remission [facility])    [facility], [facility], [address] [place], [place]    Telemedicine    11 months ago Nausea    [facility], [facility], [address], Gustavo Lianna Valentino, APRN    Telemedicine    1 year ago Physical exam, annual    Art Fulton MD    Office Visit    1 year ago Prepatellar bursitis of left knee    Stu Fulton [place], [place]    Office Visit    2 years ago Physical exam, annual    [facility], [facility],  Art Wheat MD    Office Visit

## 2022-10-06 RX ORDER — DOXEPIN HYDROCHLORIDE 10 MG/1
10 CAPSULE ORAL NIGHTLY
Qty: 90 CAPSULE | Refills: 0 | Status: SHIPPED | OUTPATIENT
Start: 2022-10-06

## 2022-10-06 NOTE — TELEPHONE ENCOUNTER
Protocol failed or has No Protocol, please review  Requested Prescriptions   Pending Prescriptions Disp Refills    DOXEPIN 10 MG Oral Cap [Pharmacy Med Name: Doxepin Hydrochloride 10 Mg Cap Nort] 90 capsule 0     Sig: Take 1 capsule (10 mg total) by mouth nightly.         There is no refill protocol information for this order           Recent Outpatient Visits              11 months ago Major depressive disorder with single episode, in partial remission [facility])    [facility], [facility],  [address]oses[address] Wheat [place], [place]    Telemedicine    1 year ago Nausea    [facility], [address], SHAWN Kee    Telemedicine    1 year ago Physical exam, annual    [facility], Fatuma Burns MD    Office Visit    1 year ago Prepatellar bursitis of left knee    Josesito Griffin [place], [place]    Office Visit    2 years ago Physical exam, annual    [facility],  Fatuma Wheat MD    Office Visit

## 2022-10-07 NOTE — TELEPHONE ENCOUNTER
I refilled medication for 90 days patient is due for follow-up visit last visit was a year ago please give her a call

## 2022-10-13 ENCOUNTER — NURSE TRIAGE (OUTPATIENT)
Dept: INTERNAL MEDICINE CLINIC | Facility: CLINIC | Age: 57
End: 2022-10-13

## 2022-10-13 NOTE — TELEPHONE ENCOUNTER
Spoke to patient, patient requesting to be seen by Dr. Mello Duggan on 10/22/22. Appointment offered and scheduled.

## 2022-10-14 RX ORDER — ESCITALOPRAM OXALATE 20 MG/1
20 TABLET ORAL DAILY
Qty: 90 TABLET | Refills: 0 | Status: SHIPPED | OUTPATIENT
Start: 2022-10-14

## 2022-10-14 NOTE — TELEPHONE ENCOUNTER
Please review. Protocol Failed or has no Protocol. Needs appt. Please reply to pool: EM RN TRIAGE    Requested Prescriptions   Pending Prescriptions Disp Refills    ESCITALOPRAM 20 MG Oral Tab [Pharmacy Med Name: Escitalopram Oxalate 20 Mg Tab Auro] 90 tablet 0     Sig: Take 1 tablet (20 mg total) by mouth daily.         Psychiatric Non-Scheduled (Anti-Anxiety) Passed - 10/13/2022  8:27 PM        Passed - In person appointment or virtual visit in the past 6 mos or appointment in next 3 mos       Recent Outpatient Visits              11 months ago Major depressive disorder with single episode, in partial remission [facility])    [address] [place]    Telemedicine    1 year ago Nausea    [facility], [address], [place]    Telemedicine    1 year ago Physical exam, annual    [address] [place]    Office Visit    1 year ago Prepatellar bursitis of left knee    [facility], Mari Burns [place], [place]    Office Visit    2 years ago Physical exam, annual    [facility], Madison Burns MD    Office Visit     Future Appointments         Provider Department Appt Notes    In 1 week Renate Corona MD [facility],  [address] Wheat[address] [facility] f/u                    Recent Outpatient Visits              11 months ago Major depressive disorder with single episode, in partial remission [facility])    [facility], Mari Burns [place], [place]    Telemedicine    1 year ago Nausea    [facility], [address], [place]    Telemedicine    1 year ago Physical exam, annual    [address] [address] Nesbitt MD    Office Visit    1 year ago Prepatellar bursitis of left knee    [address] [address]es[address] [place], [place]    Office Visit    2 years ago Physical exam, annual    [address] [address] Nesbitt MD    Office Visit Future Appointments         Provider Department Appt Notes    In 1 week Víctor Aguila MD [facility], [facility], [address], [place] med f/u

## 2022-10-14 NOTE — TELEPHONE ENCOUNTER
Pt made appt with Dr Adelaide Blanco 10/22/22 @ 9:40 am [address] [place] [address] office. Three months refill on escitalopram provided per protocol.   Please reply to juliana: MARIPOSA Barry Links

## 2022-10-22 ENCOUNTER — LAB ENCOUNTER (OUTPATIENT)
Dept: LAB | Age: 57
End: 2022-10-22
Attending: INTERNAL MEDICINE
Payer: COMMERCIAL

## 2022-10-22 ENCOUNTER — OFFICE VISIT (OUTPATIENT)
Dept: INTERNAL MEDICINE CLINIC | Facility: CLINIC | Age: 57
End: 2022-10-22
Payer: COMMERCIAL

## 2022-10-22 VITALS
HEART RATE: 75 BPM | SYSTOLIC BLOOD PRESSURE: 160 MMHG | DIASTOLIC BLOOD PRESSURE: 90 MMHG | HEIGHT: 66 IN | WEIGHT: 132 LBS | OXYGEN SATURATION: 99 % | BODY MASS INDEX: 21.21 KG/M2

## 2022-10-22 DIAGNOSIS — E55.9 VITAMIN D DEFICIENCY: ICD-10-CM

## 2022-10-22 DIAGNOSIS — F33.1 MODERATE EPISODE OF RECURRENT MAJOR DEPRESSIVE DISORDER (HCC): ICD-10-CM

## 2022-10-22 DIAGNOSIS — Z00.00 PHYSICAL EXAM, ANNUAL: Primary | ICD-10-CM

## 2022-10-22 DIAGNOSIS — Z12.31 SCREENING MAMMOGRAM FOR BREAST CANCER: ICD-10-CM

## 2022-10-22 DIAGNOSIS — Z00.00 PHYSICAL EXAM, ANNUAL: ICD-10-CM

## 2022-10-22 LAB
ALBUMIN SERPL-MCNC: 4.1 G/DL (ref 3.4–5)
ALBUMIN/GLOB SERPL: 1.1 {RATIO} (ref 1–2)
ALP LIVER SERPL-CCNC: 61 U/L
ALT SERPL-CCNC: 24 U/L
ANION GAP SERPL CALC-SCNC: 6 MMOL/L (ref 0–18)
AST SERPL-CCNC: 24 U/L (ref 15–37)
BASOPHILS # BLD AUTO: 0.07 X10(3) UL (ref 0–0.2)
BASOPHILS NFR BLD AUTO: 0.9 %
BILIRUB SERPL-MCNC: 0.4 MG/DL (ref 0.1–2)
BUN BLD-MCNC: 9 MG/DL (ref 7–18)
BUN/CREAT SERPL: 11.7 (ref 10–20)
CALCIUM BLD-MCNC: 9.3 MG/DL (ref 8.5–10.1)
CHLORIDE SERPL-SCNC: 105 MMOL/L (ref 98–112)
CHOLEST SERPL-MCNC: 155 MG/DL (ref ?–200)
CO2 SERPL-SCNC: 30 MMOL/L (ref 21–32)
CREAT BLD-MCNC: 0.77 MG/DL
DEPRECATED RDW RBC AUTO: 40.9 FL (ref 35.1–46.3)
EOSINOPHIL # BLD AUTO: 0.3 X10(3) UL (ref 0–0.7)
EOSINOPHIL NFR BLD AUTO: 3.9 %
ERYTHROCYTE [DISTWIDTH] IN BLOOD BY AUTOMATED COUNT: 12.8 % (ref 11–15)
FASTING PATIENT LIPID ANSWER: YES
FASTING STATUS PATIENT QL REPORTED: YES
GFR SERPLBLD BASED ON 1.73 SQ M-ARVRAT: 89 ML/MIN/1.73M2 (ref 60–?)
GLOBULIN PLAS-MCNC: 3.7 G/DL (ref 2.8–4.4)
GLUCOSE BLD-MCNC: 107 MG/DL (ref 70–99)
HCT VFR BLD AUTO: 38.3 %
HDLC SERPL-MCNC: 43 MG/DL (ref 40–59)
HGB BLD-MCNC: 11.9 G/DL
IMM GRANULOCYTES # BLD AUTO: 0.02 X10(3) UL (ref 0–1)
IMM GRANULOCYTES NFR BLD: 0.3 %
LDLC SERPL CALC-MCNC: 89 MG/DL (ref ?–100)
LYMPHOCYTES # BLD AUTO: 1.58 X10(3) UL (ref 1–4)
LYMPHOCYTES NFR BLD AUTO: 20.4 %
MCH RBC QN AUTO: 27 PG (ref 26–34)
MCHC RBC AUTO-ENTMCNC: 31.1 G/DL (ref 31–37)
MCV RBC AUTO: 87 FL
MONOCYTES # BLD AUTO: 0.71 X10(3) UL (ref 0.1–1)
MONOCYTES NFR BLD AUTO: 9.2 %
NEUTROPHILS # BLD AUTO: 5.05 X10 (3) UL (ref 1.5–7.7)
NEUTROPHILS # BLD AUTO: 5.05 X10(3) UL (ref 1.5–7.7)
NEUTROPHILS NFR BLD AUTO: 65.3 %
NONHDLC SERPL-MCNC: 112 MG/DL (ref ?–130)
OSMOLALITY SERPL CALC.SUM OF ELEC: 291 MOSM/KG (ref 275–295)
PLATELET # BLD AUTO: 214 10(3)UL (ref 150–450)
POTASSIUM SERPL-SCNC: 5 MMOL/L (ref 3.5–5.1)
PROT SERPL-MCNC: 7.8 G/DL (ref 6.4–8.2)
RBC # BLD AUTO: 4.4 X10(6)UL
SODIUM SERPL-SCNC: 141 MMOL/L (ref 136–145)
TRIGL SERPL-MCNC: 128 MG/DL (ref 30–149)
TSI SER-ACNC: 0.51 MIU/ML (ref 0.36–3.74)
VIT D+METAB SERPL-MCNC: 19.8 NG/ML (ref 30–100)
VLDLC SERPL CALC-MCNC: 21 MG/DL (ref 0–30)
WBC # BLD AUTO: 7.7 X10(3) UL (ref 4–11)

## 2022-10-22 PROCEDURE — 80053 COMPREHEN METABOLIC PANEL: CPT

## 2022-10-22 PROCEDURE — 3008F BODY MASS INDEX DOCD: CPT | Performed by: INTERNAL MEDICINE

## 2022-10-22 PROCEDURE — 99396 PREV VISIT EST AGE 40-64: CPT | Performed by: INTERNAL MEDICINE

## 2022-10-22 PROCEDURE — 84443 ASSAY THYROID STIM HORMONE: CPT

## 2022-10-22 PROCEDURE — 85025 COMPLETE CBC W/AUTO DIFF WBC: CPT

## 2022-10-22 PROCEDURE — 36415 COLL VENOUS BLD VENIPUNCTURE: CPT

## 2022-10-22 PROCEDURE — 3080F DIAST BP >= 90 MM HG: CPT | Performed by: INTERNAL MEDICINE

## 2022-10-22 PROCEDURE — 82306 VITAMIN D 25 HYDROXY: CPT

## 2022-10-22 PROCEDURE — 3077F SYST BP >= 140 MM HG: CPT | Performed by: INTERNAL MEDICINE

## 2022-10-22 PROCEDURE — 80061 LIPID PANEL: CPT

## 2022-10-22 RX ORDER — CITALOPRAM 40 MG/1
40 TABLET ORAL DAILY
Qty: 90 TABLET | Refills: 0 | Status: SHIPPED | OUTPATIENT
Start: 2022-10-22

## 2022-10-22 RX ORDER — AMLODIPINE BESYLATE 5 MG/1
5 TABLET ORAL DAILY
Qty: 90 TABLET | Refills: 0 | Status: SHIPPED | OUTPATIENT
Start: 2022-10-22

## 2022-11-12 ENCOUNTER — HOSPITAL ENCOUNTER (OUTPATIENT)
Dept: MAMMOGRAPHY | Age: 57
Discharge: HOME | End: 2022-11-12
Attending: INTERNAL MEDICINE
Payer: COMMERCIAL

## 2022-11-12 DIAGNOSIS — Z12.31 SCREENING MAMMOGRAM FOR BREAST CANCER: ICD-10-CM

## 2022-11-12 PROCEDURE — 77067 SCR MAMMO BI INCL CAD: CPT | Performed by: INTERNAL MEDICINE

## 2022-11-12 PROCEDURE — 77063 BREAST TOMOSYNTHESIS BI: CPT | Performed by: INTERNAL MEDICINE

## 2022-11-13 ENCOUNTER — TELEPHONE (OUTPATIENT)
Dept: INTERNAL MEDICINE CLINIC | Facility: CLINIC | Age: 57
End: 2022-11-13

## 2022-11-14 ENCOUNTER — TELEPHONE (OUTPATIENT)
Dept: INTERNAL MEDICINE CLINIC | Facility: CLINIC | Age: 57
End: 2022-11-14

## 2022-11-20 ENCOUNTER — TELEPHONE (OUTPATIENT)
Dept: INTERNAL MEDICINE CLINIC | Facility: CLINIC | Age: 57
End: 2022-11-20

## 2022-11-29 NOTE — TELEPHONE ENCOUNTER
Hugo Lin RN 11/29/2022 12:05 PM CST        ----- Message -----  From: Jacque Campbell  Sent: 11/28/2022 5:40 PM CST  To: Em Rn Triage  Subject: Howie Siegel nearly     My blood pressure was normal the next morning of taking the blood pressure medicine.  I purchased a machine and my blood pressure has been normal. Thank you

## 2022-12-17 RX ORDER — LEVOTHYROXINE SODIUM 0.1 MG/1
100 TABLET ORAL DAILY
Qty: 90 TABLET | Refills: 1 | Status: SHIPPED | OUTPATIENT
Start: 2022-12-17

## 2022-12-17 NOTE — TELEPHONE ENCOUNTER
Refill passed per [place] [employer] [employer] protocol. Requested Prescriptions   Pending Prescriptions Disp Refills    LEVOTHYROXINE 100 MCG Oral Tab [Pharmacy Med Name: Levothyroxine Sodium 100 Mcg Tab Lupi] 30 tablet 0     Sig: Take 1 tablet (100 mcg total) by mouth daily.        Thyroid Medication Protocol Passed - 12/16/2022  6:29 PM        Passed - TSH in past 12 months        Passed - Last TSH value is normal     Lab Results   Component Value Date    TSH 0.508 10/22/2022    THYROIDFUNC 0.10 (L) 12/26/2015                 Passed - In person appointment or virtual visit in the past 12 mos or appointment in next 3 mos     Recent Outpatient Visits              1 month ago Physical exam, annual    [address] [place], [place]    Office Visit    1 year ago Major depressive disorder with single episode, in partial remission [facility])    [facility], [address] [place], [place]    Telemedicine    1 year ago [facility], [address], [place] Ludwig    Telemedicine    1 year ago Physical exam, annual    [address] [place], [place]    Office Visit    2 years ago Prepatellar bursitis of left knee    [address] [place]    Office Visit                         Recent Outpatient Visits              1 month ago Physical exam, annual    [address] Zurita [place], [place]    Office Visit    1 year ago Major depressive disorder with single episode, in partial remission [facility])    [facility], [address] [place], [place]    Telemedicine    1 year ago [facility], [address], [place] Ludwig    Telemedicine    1 year ago Physical exam, annual    Ajay Mcgraw MD    Office Visit    2 years ago Prepatellar bursitis of left knee    [address], Alvaro Benitez MD    Office Visit

## 2023-01-18 RX ORDER — CITALOPRAM 40 MG/1
40 TABLET ORAL DAILY
Qty: 90 TABLET | Refills: 1 | Status: SHIPPED | OUTPATIENT
Start: 2023-01-18

## 2023-01-18 RX ORDER — AMLODIPINE BESYLATE 5 MG/1
5 TABLET ORAL DAILY
Qty: 90 TABLET | Refills: 0 | Status: SHIPPED | OUTPATIENT
Start: 2023-01-18

## 2023-01-19 ENCOUNTER — PATIENT MESSAGE (OUTPATIENT)
Dept: INTERNAL MEDICINE CLINIC | Facility: CLINIC | Age: 58
End: 2023-01-19

## 2023-01-19 ENCOUNTER — NURSE TRIAGE (OUTPATIENT)
Dept: INTERNAL MEDICINE CLINIC | Facility: CLINIC | Age: 58
End: 2023-01-19

## 2023-01-19 NOTE — TELEPHONE ENCOUNTER
Please review. Protocol failed / No Protocol. Requested Prescriptions   Pending Prescriptions Disp Refills    CITALOPRAM 40 MG Oral Tab [Pharmacy Med Name: Citalopram Hydrobromide 40 Mg Tab Torr] 90 tablet 0     Sig: Take 1 tablet (40 mg total) by mouth daily. Psychiatric Non-Scheduled (Anti-Anxiety) Passed - 1/17/2023  7:46 PM        Passed - In person appointment or virtual visit in the past 6 mos or appointment in next 3 mos     Recent Outpatient Visits              2 months ago Physical exam, annual    Angelina Coker [place], [place]    Office Visit    1 year ago Major depressive disorder with single episode, in partial remission [facility])    [address] Angelina Villa [place], [place]    Telemedicine    1 year ago [facility], [address], Gustavo Clemons, APRN    Telemedicine    1 year ago Physical exam, annual    [address] Balbir Villa MD    Office Visit    2 years ago Prepatellar bursitis of left knee    [address] Balbir Villa MD    Office Visit                        AMLODIPINE 5 MG Oral Tab [place] Med Name: Amlodipine Besylate 5 Mg Tab Unic] 90 tablet 0     Sig: Take 1 tablet (5 mg total) by mouth daily.        Hypertensive Medications Protocol Failed - 1/17/2023  7:46 PM        Failed - Last BP reading less than 140/90     BP Readings from Last 1 Encounters:  10/22/22 : 160/90              Passed - In person appointment in the past 12 or next 3 months     Recent Outpatient Visits              2 months ago Physical exam, annual    [address], [address] Balbir Villa MD    Office Visit    1 year ago Major depressive disorder with single episode, in partial remission [facility])    [address] Angelina Villa [place], [place]    Telemedicine    1 year ago Nausea [address] [facility], [facility], [facility], APRN    Telemedicine    1 year ago Physical exam, annual    [address], [facility], Rosemary Stewart MD    Office Visit    2 years ago Prepatellar bursitis of left knee    [facility], Rosemary Stewart MD    Office Visit                      Passed - CMP or BMP in past 6 months     Recent Results (from the past 4392 hour(s))   COMP METABOLIC PANEL (14)    Collection Time: 10/22/22 10:33 AM   Result Value Ref Range    Glucose 107 (H) 70 - 99 mg/dL    Sodium 141 136 - 145 mmol/L    Potassium 5.0 3.5 - 5.1 mmol/L    Chloride 105 98 - 112 mmol/L    CO2 30.0 21.0 - 32.0 mmol/L    Anion Gap 6 0 - 18 mmol/L    BUN 9 7 - 18 mg/dL    Creatinine 0.77 0.55 - 1.02 mg/dL    BUN/CREA Ratio 11.7 10.0 - 20.0    Calcium, Total 9.3 8.5 - 10.1 mg/dL    Calculated Osmolality 291 275 - 295 mOsm/kg    eGFR-Cr 89 >=60 mL/min/1.73m2    ALT 24 13 - 56 U/L    AST 24 15 - 37 U/L    Alkaline Phosphatase 61 46 - 118 U/L    Bilirubin, Total 0.4 0.1 - 2.0 mg/dL    Total Protein 7.8 6.4 - 8.2 g/dL    Albumin 4.1 3.4 - 5.0 g/dL    Globulin  3.7 2.8 - 4.4 g/dL    A/G Ratio 1.1 1.0 - 2.0    Patient Fasting for CMP? Yes      *Note: Due to a large number of results and/or encounters for the requested time period, some results have not been displayed. A complete set of results can be found in Results Review.                Passed - In person appointment or virtual visit in the past 6 months     Recent Outpatient Visits              2 months ago Physical exam, annual    [address], [facility], Rosemary Stewart MD    Office Visit    1 year ago Major depressive disorder with single episode, in partial remission [facility])    [address], [facility], Rosemary Stewart MD    Telemedicine    1 year ago [facility], [facility], [place], APRN Telemedicine    1 year ago Physical exam, annual    Abad Brennan, Rosemary Jean MD    Office Visit    2 years ago Prepatellar bursitis of left knee    Abad Brennan, Elizabeth Willis MD    Office Visit                      Passed - [facility] or GFRNAA > 50     GFR Evaluation  EGFRCR: 89 , resulted on 10/22/2022

## 2023-01-20 NOTE — TELEPHONE ENCOUNTER
See acute encounter 1/19/23,already addressed. Tony Fields RN 1/19/2023  5:43 PM CST        ----- Message -----  From: Kaitlin De Leon  Sent: 1/19/2023   1:32 PM CST  To: Em Rn Triage  Subject: Hello Doctor                                       Can you call [address] I need a zpack.  Thank you

## 2023-02-19 NOTE — TELEPHONE ENCOUNTER
Please review. Protocol failed / No Protocol. Requested Prescriptions   Pending Prescriptions Disp Refills    NADOLOL 20 MG Oral Tab [Pharmacy Med Name: Nadolol 20 Mg Tab [place]] 90 tablet 0     Sig: Take 1 tablet (20 mg total) by mouth once daily.        Hypertensive Medications Protocol Failed - 2/17/2023 10:34 PM        Failed - Last BP reading less than 140/90     BP Readings from Last 1 Encounters:  10/22/22 : 160/90              Passed - In person appointment in the past 12 or next 3 months     Recent Outpatient Visits              4 months ago Physical exam, annual    [address],[facility], [facility], Corinne Horseman, [place], [place]    Office Visit    1 year ago Major depressive disorder with single episode, in partial remission [facility])    [address] [address], [facility], Corinne Horseman, [place], [place]    Telemedicine    1 year ago [facility], [address], SHAWN Cavazos    Telemedicine    1 year ago Physical exam, annual    [address],[facility], [facility], Corinne Horseman, [place], [place]    Office Visit    2 years ago Prepatellar bursitis of left knee    [facility], Corinne Horseman, [place], [place]    Office Visit                      Passed - CMP or BMP in past 6 months     Recent Results (from the past 4392 hour(s))   COMP METABOLIC PANEL (14)    Collection Time: 10/22/22 10:33 AM   Result Value Ref Range    Glucose 107 (H) 70 - 99 mg/dL    Sodium 141 136 - 145 mmol/L    Potassium 5.0 3.5 - 5.1 mmol/L    Chloride 105 98 - 112 mmol/L    CO2 30.0 21.0 - 32.0 mmol/L    Anion Gap 6 0 - 18 mmol/L    BUN 9 7 - 18 mg/dL    Creatinine 0.77 0.55 - 1.02 mg/dL    BUN/CREA Ratio 11.7 10.0 - 20.0    Calcium, Total 9.3 8.5 - 10.1 mg/dL    Calculated Osmolality 291 275 - 295 mOsm/kg    eGFR-Cr 89 >=60 mL/min/1.73m2    ALT 24 13 - 56 U/L    AST 24 15 - 37 U/L    Alkaline Phosphatase 61 46 - 118 U/L    Bilirubin, Total 0.4 0.1 - 2.0 mg/dL Total Protein 7.8 6.4 - 8.2 g/dL    Albumin 4.1 3.4 - 5.0 g/dL    Globulin  3.7 2.8 - 4.4 g/dL    A/G Ratio 1.1 1.0 - 2.0    Patient Fasting for CMP? Yes      *Note: Due to a large number of results and/or encounters for the requested time period, some results have not been displayed. A complete set of results can be found in Results Review.                Passed - In person appointment or virtual visit in the past 6 months     Recent Outpatient Visits              4 months ago Physical exam, annual    Clotilde Osorio [place], [place]    Office Visit    1 year ago Major depressive disorder with single episode, in partial remission [facility])    [address], [facility], Rosemary Jena MD    Telemedicine    1 year ago [facility], [address], [place] Arianne Lacey, SHAWN    Telemedicine    1 year ago Physical exam, annual    Elizabeth Osorio MD    Office Visit    2 years ago Prepatellar bursitis of left knee    Elizabeth Osorio MD    Office Visit                      Passed - [facility] or GFRNAA > 50     GFR Evaluation  EGFRCR: 89 , resulted on 10/22/2022

## 2023-02-20 RX ORDER — NADOLOL 20 MG/1
20 TABLET ORAL
Qty: 90 TABLET | Refills: 0 | Status: SHIPPED | OUTPATIENT
Start: 2023-02-20

## 2023-03-03 ENCOUNTER — PATIENT MESSAGE (OUTPATIENT)
Dept: INTERNAL MEDICINE CLINIC | Facility: CLINIC | Age: 58
End: 2023-03-03

## 2023-03-03 ENCOUNTER — NURSE TRIAGE (OUTPATIENT)
Dept: INTERNAL MEDICINE CLINIC | Facility: CLINIC | Age: 58
End: 2023-03-03

## 2023-03-03 ENCOUNTER — TELEPHONE (OUTPATIENT)
Dept: INTERNAL MEDICINE CLINIC | Facility: CLINIC | Age: 58
End: 2023-03-03

## 2023-03-03 RX ORDER — ALBUTEROL SULFATE 90 UG/1
2 AEROSOL, METERED RESPIRATORY (INHALATION) EVERY 4 HOURS PRN
Qty: 3 EACH | Refills: 3 | Status: SHIPPED | OUTPATIENT
Start: 2023-03-03

## 2023-03-03 NOTE — TELEPHONE ENCOUNTER
Refill passed per [facility] protocol. Requested Prescriptions   Pending Prescriptions Disp Refills    albuterol (PROAIR HFA) 108 (90 Base) MCG/ACT Inhalation Aero Soln 1 each 2     Sig: Inhale 2 puffs into the lungs every 4 (four) hours as needed for Wheezing.        Asthma & COPD Medication Protocol Passed - 3/3/2023  7:45 AM        Passed - In person appointment or virtual visit in the past 6 mos or appointment in next 3 mos     Recent Outpatient Visits              4 months ago Physical exam, annual    [address], [facility], Rosemary Dai MD    Office Visit    1 year ago Major depressive disorder with single episode, in partial remission [facility])    [address], [facility], Rosemary Dai MD    Telemedicine    1 year ago [facility], [facility] [address], SHAWN Guerrero    Telemedicine    1 year ago Physical exam, annual    Harles Feather, [place] Terrilee Gibes, MD    Office Visit    2 years ago Prepatellar bursitis of left knee    Lizy Bhatia MD    Office Visit                                 Recent Outpatient Visits              4 months ago Physical exam, annual    Harles Feather, [place] Terrilee Gibes, MD    Office Visit    1 year ago Major depressive disorder with single episode, in partial remission [facility])    [address], [facility], Rosemary Dai MD    Telemedicine    1 year ago [facility], [address], SHAWN Guerrero    Telemedicine    1 year ago Physical exam, annual    Harles Feather, [place] Terrilee Gibes, MD    Office Visit    2 years ago Prepatellar bursitis of left knee    Lizy Bhatia MD    Office Visit

## 2023-03-04 NOTE — TELEPHONE ENCOUNTER
From: Denise Weathers MD  To: [place]  Sent: 3/3/2023 1:08 PM CST  Subject: medications    Hi Adriana!   I send rx for Z pack, and placed order for chest x ray, I would like to see you for f-up

## 2023-05-20 ENCOUNTER — PATIENT MESSAGE (OUTPATIENT)
Dept: INTERNAL MEDICINE CLINIC | Facility: CLINIC | Age: 58
End: 2023-05-20

## 2023-06-02 RX ORDER — ATORVASTATIN CALCIUM 20 MG/1
TABLET, FILM COATED ORAL
Qty: 90 TABLET | Refills: 0 | OUTPATIENT
Start: 2023-06-02

## 2023-06-02 RX ORDER — ATORVASTATIN CALCIUM 20 MG/1
20 TABLET, FILM COATED ORAL NIGHTLY
Qty: 90 TABLET | Refills: 3 | Status: SHIPPED | OUTPATIENT
Start: 2023-06-02

## 2023-06-02 NOTE — TELEPHONE ENCOUNTER
Refill passed per [facility] protocol   Requested Prescriptions   Pending Prescriptions Disp Refills    atorvastatin 20 MG Oral Tab 90 tablet 1     Sig: Take 1 tablet (20 mg total) by mouth nightly.        Cholesterol Medication Protocol Passed - 6/1/2023  4:21 PM        Passed - ALT in past 12 months        Passed - LDL in past 12 months        Passed - Last ALT < 80     Lab Results   Component Value Date    ALT 24 10/22/2022             Passed - Last LDL < 130     Lab Results   Component Value Date    LDL 89 10/22/2022               Passed - In person appointment or virtual visit in the past 12 mos or appointment in next 3 mos     Recent Outpatient Visits              7 months ago Physical exam, annual    [address], [place], Rosemary Samaniego MD    Office Visit    1 year ago Major depressive disorder with single episode, in partial remission [facility])    [address], [place], Rosemary Samaniego MD    Telemedicine    1 year ago [facility], [address], [place] Juanda Carrel, APRN    Telemedicine    1 year ago Physical exam, annual    Eileen Siddiqi MD    Office Visit    2 years ago Prepatellar bursitis of left knee    Eileen Siddiqi MD    Office Visit

## 2023-06-04 RX ORDER — ATORVASTATIN CALCIUM 20 MG/1
20 TABLET, FILM COATED ORAL NIGHTLY
Qty: 90 TABLET | Refills: 3 | OUTPATIENT
Start: 2023-06-04

## 2023-06-22 RX ORDER — DOXEPIN HYDROCHLORIDE 10 MG/1
10 CAPSULE ORAL NIGHTLY
Qty: 90 CAPSULE | Refills: 1 | Status: SHIPPED | OUTPATIENT
Start: 2023-06-22

## 2023-06-22 RX ORDER — DOXEPIN HYDROCHLORIDE 10 MG/1
10 CAPSULE ORAL NIGHTLY
Qty: 90 CAPSULE | Refills: 3 | Status: SHIPPED | OUTPATIENT
Start: 2023-06-22

## 2023-06-22 NOTE — TELEPHONE ENCOUNTER
Please review; protocol failed. Requested Prescriptions   Pending Prescriptions Disp Refills    doxepin 10 MG Oral Cap 90 capsule 1     Sig: Take 1 capsule (10 mg total) by mouth nightly.        There is no refill protocol information for this order          Recent Outpatient Visits              8 months ago Physical exam, annual    [address], Rosemary Rosales MD    Office Visit    1 year ago Major depressive disorder with single episode, in partial remission [facility])    [address], [facility]    Tele[facility]icine    1 year ago [facility], [address], [place] Radha Walters, APRN    Telemedicine    1 year ago Physical exam, annual    [address], Roula Cummings MD    Office Visit    2 years ago Prepatellar bursitis of left knee    [address], MD    Office Visit

## 2023-06-22 NOTE — TELEPHONE ENCOUNTER
Please review refill protocol failed/ no protocol  Requested Prescriptions   Pending Prescriptions Disp Refills    DOXEPIN 10 MG Oral Cap [Pharmacy Med Name: Doxepin Hydrochloride 10 Mg Cap Nort] 90 capsule 0     Sig: Take 1 capsule (10 mg total) by mouth nightly.        There is no refill protocol information for this order

## 2023-07-04 RX ORDER — LEVOTHYROXINE SODIUM 0.1 MG/1
100 TABLET ORAL DAILY
Qty: 90 TABLET | Refills: 3 | Status: SHIPPED | OUTPATIENT
Start: 2023-07-04

## 2023-07-04 RX ORDER — LEVOTHYROXINE SODIUM 0.1 MG/1
100 TABLET ORAL DAILY
Qty: 90 TABLET | Refills: 1 | OUTPATIENT
Start: 2023-07-04

## 2023-07-04 NOTE — TELEPHONE ENCOUNTER
Refill passed per [facility], [facility] protocol.     Requested Prescriptions   Pending Prescriptions Disp Refills    LEVOTHYROXINE 100 MCG Oral Tab [Pharmacy Med Name: Levothyroxine Sodium 100 Mcg Tab Lupi] 90 tablet 0     Sig: TAKE ONE TABLET BY MOUTH ONE TIME DAILY       Thyroid Medication Protocol Passed - 7/3/2023  9:04 PM        Passed - TSH in past 12 months        Passed - Last TSH value is normal     Lab Results   Component Value Date    TSH 0.508 10/22/2022    THYROIDFUNC 0.10 (L) 12/26/2015                 Passed - In person appointment or virtual visit in the past 12 mos or appointment in next 3 mos     Recent Outpatient Visits              8 months ago Physical exam, annual    Jamir Gomez [facility] Rosemary Ventura MD    Office Visit    1 year ago Major depressive disorder with single episode, in partial remission ([facility] [address]    Jamir Gomez [facility], [place] Jerona Cargo, MD    Telemedicine    1 year ago [facility], [address], [place]    Telemedicine    1 year ago Physical exam, annual    Deryl President, [place] Jerona Cargo, MD    Office Visit    2 years ago Prepatellar bursitis of left knee    Jose Lockwood MD    Office Visit                         Recent Outpatient Visits              8 months ago Physical exam, annual Deryl President, [place] Jerona Cargo, MD    Office Visit    1 year ago Major depressive disorder with single episode, in partial remission [facility])    Jamir Gomez [facility] Rosemary Ventura MD    Telemedicine    1 year ago [facility], [facility], [place]    Telemedicine    1 year ago Physical exam, annual Deryl President, [place] Jerona Cargo, MD    Office Visit    2 years ago Prepatellar bursitis of left knee    [address], [facility], Carmenza Leary MD    Office Visit

## 2023-07-19 RX ORDER — CITALOPRAM 40 MG/1
40 TABLET ORAL DAILY
Qty: 90 TABLET | Refills: 0 | Status: SHIPPED | OUTPATIENT
Start: 2023-07-19

## 2023-07-19 NOTE — TELEPHONE ENCOUNTER
Please review. Protocol failed / No protocol. Requested Prescriptions   Pending Prescriptions Disp Refills    CITALOPRAM 40 MG Oral Tab [Pharmacy Med Name: Citalopram Hydrobromide 40 Mg Tab Marilyn] 90 tablet 0     Sig: Take 1 tablet by mouth daily.        Psychiatric Non-Scheduled (Anti-Anxiety) Failed - 7/18/2023  8:12 PM        Failed - In person appointment or virtual visit in the past 6 mos or appointment in next 3 mos     Recent Outpatient Visits              9 months ago Physical exam, annual    [address], [place]igeRosemary Parker MD    Office Visit    1 year ago Major depressive disorder with single episode, in partial remission [facility])    [address], [facility]    [facility]medicine    1 year ago [facility], [facility], [place]    Telemedicine    1 year ago Physical exam, annual    [address], [place] Jerona Cargo, MD    Office Visit    2 years ago Prepatellar bursitis of left knee    [address] [facility] Jose Estrada MD    Office Visit                            Recent Outpatient Visits              9 months ago Physical exam, annual    [address], [place] Jerona Cargo, MD    Office Visit    1 year ago Major depressive disorder with single episode, in partial remission [facility])    [address], [facility] Rosemary Ventura MD    Telemedicine    1 year ago [facility], [facility] [place]    Telemedicine    1 year ago Physical exam, annual    [address], [place] Jerona Cargo, MD    Office Visit    2 years ago Prepatellar bursitis of left knee    [address], Jose Estrada MD    Office Visit

## 2023-07-19 NOTE — TELEPHONE ENCOUNTER
Failed Protocol/No Protocol. 90 day refill given on 07/19/23, appointment needed for further refills. Requested Prescriptions   Pending Prescriptions Disp Refills    citalopram 40 MG Oral Tab 90 tablet 1     Sig: Take 1 tablet (40 mg total) by mouth daily.        Psychiatric Non-Scheduled (Anti-Anxiety) Failed - 7/18/2023  8:10 PM        Failed - In person appointment or virtual visit in the past 6 mos or appointment in next 3 mos     Recent Outpatient Visits              9 months ago Physical exam, annual    Abad Miller, Rosemary Almeida MD    Office Visit    1 year ago Major depressive disorder with single episode, in partial remission [facility])    Abad Miller Odella Le, [place], MD    Telemedicine    1 year ago [facility] [place]    Telemedicine    1 year ago Physical exam, annual    Verne Minister, [place] Cal[place] Hy, MD    Office Visit    2 years ago Prepatellar bursitis of left knee    Reema Whittington MD    Office Visit                             Recent Outpatient Visits              9 months ago Physical exam, annual    Verne Minister, [place] Cal[place] Hy, MD    Office Visit    1 year ago Major depressive disorder with single episode, in partial remission [facility])    Abad Miller Odella Le, [place], MD    Telemedicine    1 year ago [facility], [facility], [place]    Telemedicine    1 year ago Physical exam, annual    Verne Minister, [place] Cal[place] Hy, MD    Office Visit    2 years ago Prepatellar bursitis of left knee    Reema Whittingotn MD    Office Visit

## 2023-07-22 NOTE — TELEPHONE ENCOUNTER
Future Appointments   Date Time Provider Tanvir Hu   7/28/2023 10:00 AM Hailee Kulkarni MD [facility]

## 2023-07-28 ENCOUNTER — HOSPITAL ENCOUNTER (OUTPATIENT)
Dept: GENERAL RADIOLOGY | Age: 58
Discharge: HOME | End: 2023-07-28
Attending: INTERNAL MEDICINE
Payer: COMMERCIAL

## 2023-07-28 ENCOUNTER — OFFICE VISIT (OUTPATIENT)
Dept: INTERNAL MEDICINE CLINIC | Facility: CLINIC | Age: 58
End: 2023-07-28

## 2023-07-28 ENCOUNTER — LAB ENCOUNTER (OUTPATIENT)
Dept: LAB | Age: 58
End: 2023-07-28
Attending: INTERNAL MEDICINE
Payer: COMMERCIAL

## 2023-07-28 VITALS
RESPIRATION RATE: 16 BRPM | HEIGHT: 66 IN | BODY MASS INDEX: 22.21 KG/M2 | DIASTOLIC BLOOD PRESSURE: 74 MMHG | SYSTOLIC BLOOD PRESSURE: 131 MMHG | WEIGHT: 138.19 LBS | HEART RATE: 62 BPM

## 2023-07-28 DIAGNOSIS — E55.9 VITAMIN D DEFICIENCY: ICD-10-CM

## 2023-07-28 DIAGNOSIS — I10 PRIMARY HYPERTENSION: ICD-10-CM

## 2023-07-28 DIAGNOSIS — Z00.00 PHYSICAL EXAM, ANNUAL: ICD-10-CM

## 2023-07-28 DIAGNOSIS — F32.4 MAJOR DEPRESSIVE DISORDER WITH SINGLE EPISODE, IN PARTIAL REMISSION (HCC): ICD-10-CM

## 2023-07-28 DIAGNOSIS — Z85.850 HISTORY OF THYROID CANCER: ICD-10-CM

## 2023-07-28 DIAGNOSIS — E78.00 PURE HYPERCHOLESTEROLEMIA: Primary | ICD-10-CM

## 2023-07-28 DIAGNOSIS — R05.1 ACUTE COUGH: ICD-10-CM

## 2023-07-28 LAB
ALBUMIN SERPL-MCNC: 4 G/DL (ref 3.4–5)
ALBUMIN/GLOB SERPL: 1 {RATIO} (ref 1–2)
ALP LIVER SERPL-CCNC: 66 U/L
ALT SERPL-CCNC: 27 U/L
ANION GAP SERPL CALC-SCNC: 3 MMOL/L (ref 0–18)
AST SERPL-CCNC: 26 U/L (ref 15–37)
BASOPHILS # BLD AUTO: 0.07 X10(3) UL (ref 0–0.2)
BASOPHILS NFR BLD AUTO: 1.2 %
BILIRUB SERPL-MCNC: 0.5 MG/DL (ref 0.1–2)
BUN BLD-MCNC: 10 MG/DL (ref 7–18)
BUN/CREAT SERPL: 12.8 (ref 10–20)
CALCIUM BLD-MCNC: 9.3 MG/DL (ref 8.5–10.1)
CHLORIDE SERPL-SCNC: 105 MMOL/L (ref 98–112)
CHOLEST SERPL-MCNC: 198 MG/DL (ref ?–200)
CO2 SERPL-SCNC: 32 MMOL/L (ref 21–32)
CREAT BLD-MCNC: 0.78 MG/DL
DEPRECATED RDW RBC AUTO: 39.8 FL (ref 35.1–46.3)
EGFRCR SERPLBLD CKD-EPI 2021: 87 ML/MIN/1.73M2 (ref 60–?)
EOSINOPHIL # BLD AUTO: 0.46 X10(3) UL (ref 0–0.7)
EOSINOPHIL NFR BLD AUTO: 8.2 %
ERYTHROCYTE [DISTWIDTH] IN BLOOD BY AUTOMATED COUNT: 12.9 % (ref 11–15)
FASTING PATIENT LIPID ANSWER: YES
FASTING STATUS PATIENT QL REPORTED: YES
GLOBULIN PLAS-MCNC: 4.1 G/DL (ref 2.8–4.4)
GLUCOSE BLD-MCNC: 93 MG/DL (ref 70–99)
HCT VFR BLD AUTO: 40.2 %
HDLC SERPL-MCNC: 49 MG/DL (ref 40–59)
HGB BLD-MCNC: 12.4 G/DL
IMM GRANULOCYTES # BLD AUTO: 0.02 X10(3) UL (ref 0–1)
IMM GRANULOCYTES NFR BLD: 0.4 %
LDLC SERPL CALC-MCNC: 128 MG/DL (ref ?–100)
LYMPHOCYTES # BLD AUTO: 1.52 X10(3) UL (ref 1–4)
LYMPHOCYTES NFR BLD AUTO: 27 %
MCH RBC QN AUTO: 26.4 PG (ref 26–34)
MCHC RBC AUTO-ENTMCNC: 30.8 G/DL (ref 31–37)
MCV RBC AUTO: 85.7 FL
MONOCYTES # BLD AUTO: 0.55 X10(3) UL (ref 0.1–1)
MONOCYTES NFR BLD AUTO: 9.8 %
NEUTROPHILS # BLD AUTO: 3.02 X10 (3) UL (ref 1.5–7.7)
NEUTROPHILS # BLD AUTO: 3.02 X10(3) UL (ref 1.5–7.7)
NEUTROPHILS NFR BLD AUTO: 53.4 %
NONHDLC SERPL-MCNC: 149 MG/DL (ref ?–130)
OSMOLALITY SERPL CALC.SUM OF ELEC: 289 MOSM/KG (ref 275–295)
PLATELET # BLD AUTO: 256 10(3)UL (ref 150–450)
POTASSIUM SERPL-SCNC: 4.9 MMOL/L (ref 3.5–5.1)
PROT SERPL-MCNC: 8.1 G/DL (ref 6.4–8.2)
RBC # BLD AUTO: 4.69 X10(6)UL
SODIUM SERPL-SCNC: 140 MMOL/L (ref 136–145)
TRIGL SERPL-MCNC: 118 MG/DL (ref 30–149)
TSI SER-ACNC: 0.13 MIU/ML (ref 0.36–3.74)
VIT D+METAB SERPL-MCNC: 24.4 NG/ML (ref 30–100)
VLDLC SERPL CALC-MCNC: 21 MG/DL (ref 0–30)
WBC # BLD AUTO: 5.6 X10(3) UL (ref 4–11)

## 2023-07-28 PROCEDURE — 80053 COMPREHEN METABOLIC PANEL: CPT

## 2023-07-28 PROCEDURE — 85025 COMPLETE CBC W/AUTO DIFF WBC: CPT

## 2023-07-28 PROCEDURE — 3075F SYST BP GE 130 - 139MM HG: CPT | Performed by: INTERNAL MEDICINE

## 2023-07-28 PROCEDURE — 3008F BODY MASS INDEX DOCD: CPT | Performed by: INTERNAL MEDICINE

## 2023-07-28 PROCEDURE — 80061 LIPID PANEL: CPT

## 2023-07-28 PROCEDURE — 71046 X-RAY EXAM CHEST 2 VIEWS: CPT | Performed by: INTERNAL MEDICINE

## 2023-07-28 PROCEDURE — 3078F DIAST BP <80 MM HG: CPT | Performed by: INTERNAL MEDICINE

## 2023-07-28 PROCEDURE — 84443 ASSAY THYROID STIM HORMONE: CPT

## 2023-07-28 PROCEDURE — 36415 COLL VENOUS BLD VENIPUNCTURE: CPT

## 2023-07-28 PROCEDURE — 99214 OFFICE O/P EST MOD 30 MIN: CPT | Performed by: INTERNAL MEDICINE

## 2023-07-28 PROCEDURE — 86800 THYROGLOBULIN ANTIBODY: CPT

## 2023-07-28 PROCEDURE — 82306 VITAMIN D 25 HYDROXY: CPT

## 2023-07-28 RX ORDER — VENLAFAXINE HYDROCHLORIDE 37.5 MG/1
37.5 CAPSULE, EXTENDED RELEASE ORAL DAILY
Qty: 90 CAPSULE | Refills: 0 | Status: SHIPPED | OUTPATIENT
Start: 2023-07-28

## 2023-07-28 NOTE — PROGRESS NOTES
Subjective:     Patient ID: Miya Howard is a 60year old female. 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 She did not feel any improvement from changing to citalopram.  States that she has nothing to worry about in her life but does not feel happy. Uses doxepin 10 to 20 mg at bedtime for sleep, her appetite in general improved but he has days when she does not want to eat. She did gain 6 pounds. Blood pressure is running normal taking nadolol and amlodipine now. History of thyroid cancer years ago taking levothyroxine on a regular basis. History/Other:   Review of Systems  Current Outpatient Medications   Medication Sig Dispense Refill    venlafaxine ER 37.5 MG Oral Capsule SR 24 Hr Take 1 capsule (37.5 mg total) by mouth daily. 90 capsule 0    citalopram 40 MG Oral Tab Take 1 tablet (40 mg total) by mouth daily. 90 tablet 0    citalopram 40 MG Oral Tab Take 1 tablet (40 mg total) by mouth daily. 90 tablet 0    levothyroxine 100 MCG Oral Tab Take 1 tablet (100 mcg total) by mouth daily. 90 tablet 3    doxepin 10 MG Oral Cap Take 1 capsule (10 mg total) by mouth nightly. 90 capsule 3    doxepin 10 MG Oral Cap Take 1 capsule (10 mg total) by mouth nightly. 90 capsule 1    atorvastatin 20 MG Oral Tab Take 1 tablet (20 mg total) by mouth nightly. 90 tablet 3    nadolol 20 MG Oral Tab Take 1 tablet (20 mg total) by mouth once daily. 90 tablet 1    amLODIPine 5 MG Oral Tab Take 1 tablet (5 mg total) by mouth daily. 90 tablet 1    albuterol (PROAIR HFA) 108 (90 Base) MCG/ACT Inhalation Aero Soln Inhale 2 puffs into the lungs every 4 (four) hours as needed for Wheezing.  3 each 3    ergocalciferol 1.25 MG (24856 UT) Oral Cap Take 1 capsule (50,000 Units total) by mouth once a week. 12 capsule 1     Allergies:No Known Allergies    Past Medical History:   Diagnosis Date    Anxiety     Cancer ([facility] [address]     Disorder of thyroid     Exposure to medical diagnostic radiation     at age 31    Thyroid cancer ([facility] [address]     Thyroid disease     Visual impairment     glasses      Past Surgical History:   Procedure Laterality Date    COLONOSCOPY N/A 2022    Procedure: COLONOSCOPY WITH COLD SNARE POLYPECTOMY;  Surgeon: Bonnie Walls MD;  Location: [address] [place] [address]    THYROID ABLATION, CARCINOMA      TONSILLECTOMY      TREAT ECTOPIC PREG,RMV TUBE/OVARY        Family History   Problem Relation Age of Onset    Lipids Brother 62    Heart Disease Father 72        (cause of death)    Heart Disease Mother 76        (cause of death)    Lipids Brother 48      Social History:   Social History     Socioeconomic History    Marital status:    Tobacco Use    Smoking status: Former     Packs/day: 1.00     Years: 12.00     Pack years: 12.00     Types: Cigarettes     Quit date: 2013     Years since quittin.9    Smokeless tobacco: Never   Vaping Use    Vaping Use: Never used   Substance and Sexual Activity    Alcohol use: No    Drug use: Yes     Types: Cannabis     Comment: occasional   Other Topics Concern    Caffeine Concern Yes     Comment: soda, coffee, 1 cup daily    Left Handed No    Right Handed Yes    Currently spends a great deal of time in the sun No    History of tanning No    Hx of Spending [place] of Time in Sun No    Bad sunburns in the past No    Tanning Salons in the Past No    Hx of Radiation Treatments Yes        Objective:   Physical Exam  Constitutional:       Appearance: Normal appearance. HENT:      Head: Normocephalic and atraumatic. Eyes:      General: No scleral icterus. Extraocular Movements: Extraocular movements intact. Conjunctiva/sclera: Conjunctivae normal.      Pupils: Pupils are equal, round, and reactive to light.    Neck:      Vascular: No carotid bruit. Cardiovascular:      Rate and Rhythm: Normal rate and regular rhythm. Heart sounds: No murmur heard. No gallop. Pulmonary:      Effort: Pulmonary effort is normal. No respiratory distress. Breath sounds: No wheezing or rhonchi. Abdominal:      General: Bowel sounds are normal.      Palpations: Abdomen is soft. There is no mass. Tenderness: There is no abdominal tenderness. There is no guarding. Hernia: No hernia is present. Musculoskeletal:         General: Normal range of motion. Cervical back: Normal range of motion and neck supple. Right lower leg: No edema. Left lower leg: No edema. Lymphadenopathy:      Cervical: No cervical adenopathy. Skin:     General: Skin is warm. Coloration: Skin is not jaundiced. Neurological:      General: No focal deficit present. Mental Status: She is alert and oriented to person, place, and time. Mental status is at baseline. Psychiatric:         Mood and Affect: Mood normal.         Speech: Speech normal.         Behavior: Behavior normal. Behavior is cooperative. Thought Content: Thought content normal. Thought content does not include suicidal ideation.          Cognition and Memory: Cognition and memory normal.         Judgment: Judgment normal.         Assessment & Plan:   Major depressive disorder with single episode, in partial remission, will add Effexor ER 37.5 mg daily continue citalopram 40 mg daily for about 2 weeks, report in 2 weeks, we may start switching Effexor increasing and decreasing citalopram and see how she does  Pure hypercholesterolemia CMP lipids  History of thyroid cancer check TSH level tumor markers    Primary hypertension controlled on current medication  Physical exam, annual patient will return for GYN exam during physical exam    Vitamin D deficiency check vitamin D level treat if necessary    Orders Placed This Encounter      Comp Metabolic Panel (14)      Lipid Panel Vitamin D      CBC With Differential With Platelet      Assay, Thyroid Stim Hormone      Thyroglobulin Antibody and Tumor Marker [E]      Meds This Visit:  Requested Prescriptions     Signed Prescriptions Disp Refills    venlafaxine ER 37.5 MG Oral Capsule SR 24 Hr 90 capsule 0     Sig: Take 1 capsule (37.5 mg total) by mouth daily.    Follow-up in 6 weeks      None

## 2023-07-31 LAB
THYROGLOB AB: <1 IU/ML
THYROGLOB IMA: <0.1 NG/ML

## 2023-08-03 ENCOUNTER — TELEPHONE (OUTPATIENT)
Dept: INTERNAL MEDICINE CLINIC | Facility: CLINIC | Age: 58
End: 2023-08-03

## 2023-10-14 ENCOUNTER — PATIENT MESSAGE (OUTPATIENT)
Dept: INTERNAL MEDICINE CLINIC | Facility: CLINIC | Age: 58
End: 2023-10-14

## 2023-10-16 NOTE — TELEPHONE ENCOUNTER
From: Camille Jeans  To: Goldie Vasquez  Sent: 10/14/2023 9:07 PM CDT  Subject: Alyx Crawford    Just wandering what is my Body Mass index? Thank you.

## 2023-10-20 RX ORDER — VENLAFAXINE HYDROCHLORIDE 37.5 MG/1
37.5 CAPSULE, EXTENDED RELEASE ORAL DAILY
Qty: 90 CAPSULE | Refills: 3 | Status: SHIPPED | OUTPATIENT
Start: 2023-10-20

## 2023-10-20 RX ORDER — AMLODIPINE BESYLATE 5 MG/1
5 TABLET ORAL DAILY
Qty: 90 TABLET | Refills: 3 | Status: SHIPPED | OUTPATIENT
Start: 2023-10-20

## 2023-10-20 RX ORDER — VENLAFAXINE HYDROCHLORIDE 37.5 MG/1
37.5 CAPSULE, EXTENDED RELEASE ORAL DAILY
Qty: 90 CAPSULE | Refills: 0 | OUTPATIENT
Start: 2023-10-20

## 2023-10-20 RX ORDER — AMLODIPINE BESYLATE 5 MG/1
5 TABLET ORAL DAILY
Qty: 90 TABLET | Refills: 1 | OUTPATIENT
Start: 2023-10-20

## 2023-10-20 NOTE — TELEPHONE ENCOUNTER
Refill passed per [facility], [facility] protocol. Please review pended refill request as unable to refill due to high/very high interaction warning copied here:    High  Drug-Drug: doxepin and venlafaxine ERAdditive serotonergic effects may occur during coadministration of serotonin/norepinephrine reuptake inhibitors (SNRIs) and tricyclic antidepressants (TCAs), and the risk of developing serotonin syndrome may be increased. Details  Override reason        venlafaxine ER 37.5 MG Oral Capsule SR 24 Hr [Pharmacy Med Name: Venlafaxine Hcl Er 24hr 37.5 Mg Cap Auro]  Prescription. Reordered. doxepin 10 MG Oral CapPrescription. Active. Discontinue  doxepin 10 MG Oral CapPrescription. Active. Discontinue  Medium  Duplicate Therapy: citalopram, venlafaxine ERSSRIS AND SNRIS. No Abuse/Dependency Potential.  Details  Override reason        venlafaxine ER 37.5 MG Oral Capsule SR 24 Hr [Pharmacy Med Name: Venlafaxine Hcl Er 24hr 37.5 Mg Cap Auro], Daily  Prescription. Reordered. citalopram 40 MG Oral Tab, DailyPrescription. Active. Discontinue  citalopram 40 MG Oral Tab, DailyPrescription. Active. Discontinue  Unranked  Duplicate Therapy: doxepin, citalopram, venlafaxine ERANTIDEPRESSANTS  Details  Override reason        venlafaxine ER 37.5 MG Oral Capsule SR 24 Hr [Pharmacy Med Name: Venlafaxine Hcl Er 24hr 37.5 Mg Cap Auro], Daily  Prescription. Reordered. citalopram 40 MG Oral Tab, DailyPrescription. Active. Discontinue  citalopram 40 MG Oral Tab, DailyPrescription. Active. Discontinue  doxepin 10 MG Oral Cap, NightlyPrescription. Active. Discontinue  doxepin 10 MG Oral Cap, Nightly  Requested Prescriptions   Pending Prescriptions Disp Refills    venlafaxine ER 37.5 MG Oral Capsule SR 24 Hr [Pharmacy Med Name: Venlafaxine Hcl Er 24hr 37.5 Mg Cap Auro] 90 capsule 3     Sig: Take 1 capsule (37.5 mg total) by mouth daily.        Psychiatric Non-Scheduled (Anti-Anxiety) Passed - 10/19/2023  9:47 PM        Passed - In person appointment or virtual visit in the past 6 mos or appointment in next 3 mos     Recent Outpatient Visits              2 months ago Pure hypercholesterolemia    [address], [place], MD    Office Visit    12 months ago Physical exam, annual    [address], [place], Rosemary Gusman MD    Office Visit    2 years ago Major depressive disorder with single episode, in partial remission [facility])    [address], [place], Rosemary Gusman MD    Telemedicine    2 years ago Nausea    [facility], [facility] [place]    Telemedicine    2 years ago Physical exam, annual    [address], [facility] [place] Villa MD    Office Visit                       Signed Prescriptions Disp Refills    amLODIPine 5 MG Oral Tab 90 tablet 3     Sig: Take 1 tablet (5 mg total) by mouth daily.        Hypertensive Medications Protocol Passed - 10/19/2023  9:47 PM        Passed - In person appointment in the past 12 or next 3 months     Recent Outpatient Visits              2 months ago Pure hypercholesterolemia    [address], [facility] Anel Villa [place], MD    Office Visit    12 months ago Physical exam, annual    [address], [facility] Anel Villa [place], MD    Office Visit    2 years ago Major depressive disorder with single episode, in partial remission [facility])    [address], [facility] Anel Villa [place], MD    Telemedicine    2 years ago [facility], [address], [place]    Telemedicine    2 years ago Physical exam, annual    [address], [place] Saun[place], MD    Office Visit                      Passed - Last BP reading less than 140/90     BP Readings from Last 1 Encounters:  07/28/23 : 131/74              Passed - CMP or BMP in past 6 months     Recent Results (from the past 4392 hour(s))   Comp Metabolic Panel (14)    Collection Time: 07/28/23 11:04 AM   Result Value Ref Range    Glucose 93 70 - 99 mg/dL    Sodium 140 136 - 145 mmol/L    Potassium 4.9 3.5 - 5.1 mmol/L    Chloride 105 98 - 112 mmol/L    CO2 32.0 21.0 - 32.0 mmol/L    Anion Gap 3 0 - 18 mmol/L    BUN 10 7 - 18 mg/dL    Creatinine 0.78 0.55 - 1.02 mg/dL    BUN/CREA Ratio 12.8 10.0 - 20.0    Calcium, Total 9.3 8.5 - 10.1 mg/dL    Calculated Osmolality 289 275 - 295 mOsm/kg    eGFR-Cr 87 >=60 mL/min/1.73m2    ALT 27 13 - 56 U/L    AST 26 15 - 37 U/L    Alkaline Phosphatase 66 46 - 118 U/L    Bilirubin, Total 0.5 0.1 - 2.0 mg/dL    Total Protein 8.1 6.4 - 8.2 g/dL    Albumin 4.0 3.4 - 5.0 g/dL    Globulin  4.1 2.8 - 4.4 g/dL    A/G Ratio 1.0 1.0 - 2.0    Patient Fasting for CMP? Yes      *Note: Due to a large number of results and/or encounters for the requested time period, some results have not been displayed. A complete set of results can be found in Results Review.                Passed - In person appointment or virtual visit in the past 6 months     Recent Outpatient Visits              2 months ago Pure hypercholesterolemia    [address],[facility], [facility] [place]    [facility] Visit    12 months ago Physical exam, annual    Juan Longo [place], MD    Office Visit    2 years ago Major depressive disorder with single episode, in partial remission [facility])    [address], [facility] [place]    Telemedicine    2 years ago [facility], [address], [place]    Telemedicine    2 years ago Physical exam, annual    Johnson Longo MD    Office Visit                      Passed - [facility] or GFRNAA > 50     GFR Evaluation  EGFRCR: 87 , resulted on 7/28/2023 Recent Outpatient Visits              2 months ago Pure hypercholesterolemia    [facility], Abad Villa, Rosemary Almeida MD    Office Visit    12 months ago Physical exam, annual    Abad Miller Odella Le, [place], MD    Office Visit    2 years ago Major depressive disorder with single episode, in partial remission [facility])    Abad Miller, Rosemary Almeida MD    Telemedicine    2 years ago Nausea    [facility], [address], [place]ena    Telemedicine    2 years ago Physical exam, annual    Abad Miller Patt [place], MD    Office Visit

## 2023-10-20 NOTE — TELEPHONE ENCOUNTER
Refill passed per [facility] protocol. Requested Prescriptions   Pending Prescriptions Disp Refills    AMLODIPINE 5 MG Oral Tab [Pharmacy Med Name: Amlodipine Besylate 5 Mg Tab Unic] 90 tablet 0     Sig: Take 1 tablet (5 mg total) by mouth daily.        Hypertensive Medications Protocol Passed - 10/19/2023  9:47 PM        Passed - In person appointment in the past 12 or next 3 months     Recent Outpatient Visits              2 months ago Pure hypercholesterolemia    [address],[facility], [facility], [facility]    Office Visit    12 months ago Physical exam, annual    [address],[facility], [facility]    Office Visit    2 years ago Major depressive disorder with single episode, in partial remission [facility])    [address],[facility], [facility], [facility]    Telemedicine    2 years ago [facility], [facility], [place] Venkat Mejia, APRN    Telemedicine    2 years ago Physical exam, annual    [address], [facility], [facility]    Office Visit                      Passed - Last BP reading less than 140/90     BP Readings from Last 1 Encounters:  07/28/23 : 131/74              Passed - CMP or BMP in past 6 months     Recent Results (from the past 4392 hour(s))   Comp Metabolic Panel (14)    Collection Time: 07/28/23 11:04 AM   Result Value Ref Range    Glucose 93 70 - 99 mg/dL    Sodium 140 136 - 145 mmol/L    Potassium 4.9 3.5 - 5.1 mmol/L    Chloride 105 98 - 112 mmol/L    CO2 32.0 21.0 - 32.0 mmol/L    Anion Gap 3 0 - 18 mmol/L    BUN 10 7 - 18 mg/dL    Creatinine 0.78 0.55 - 1.02 mg/dL    BUN/CREA Ratio 12.8 10.0 - 20.0    Calcium, Total 9.3 8.5 - 10.1 mg/dL    Calculated Osmolality 289 275 - 295 mOsm/kg    eGFR-Cr 87 >=60 mL/min/1.73m2    ALT 27 13 - 56 U/L    AST 26 15 - 37 U/L    Alkaline Phosphatase 66 46 - 118 U/L    Bilirubin, Total 0.5 0.1 - 2.0 mg/dL Total Protein 8.1 6.4 - 8.2 g/dL    Albumin 4.0 3.4 - 5.0 g/dL    Globulin  4.1 2.8 - 4.4 g/dL    A/G Ratio 1.0 1.0 - 2.0    Patient Fasting for CMP? Yes      *Note: Due to a large number of results and/or encounters for the requested time period, some results have not been displayed. A complete set of results can be found in Results Review. Passed - In person appointment or virtual visit in the past 6 months     Recent Outpatient Visits              2 months ago Pure hypercholesterolemia    [address], [place], Rosemary Ponce MD    Office Visit    12 months ago [facility] exam, annual    [address] [facility] [facility] [place]    Office Visit    2 years ago Major depressive disorder with single episode, in partial remission [facility])    [address], [facility] [place]    Telemedicine    2 years ago [facility], [address], [facility], APRN    Telemedicine    2 years ago Physical exam, annual    [address],[facility], [facility]    Office Visit                      Passed - EGFRCR or GFRNAA > 50     GFR Evaluation  EGFRCR: 87 , resulted on 7/28/2023            VENLAFAXINE ER 37.5 MG Oral Capsule SR 24 Hr [Pharmacy Med Name: Venlafaxine Hcl Er 24hr 37.5 Mg Cap Auro] 90 capsule 0     Sig: Take 1 capsule (37.5 mg total) by mouth daily.        Psychiatric Non-Scheduled (Anti-Anxiety) Passed - 10/19/2023  9:47 PM        Passed - In person appointment or virtual visit in the past 6 mos or appointment in next 3 mos     Recent Outpatient Visits              2 months ago Pure hypercholesterolemia    [address], [facility], Rosemary Ponce MD    Office Visit    12 months ago Physical exam, annual    [address], [facility] Steffi Villa [place], MD    Office Visit    2 years ago Major depressive disorder with single episode, in partial remission [facility])    [facility], [facility], [place] Rosemary Judd MD    Telemedicine    2 years ago Nausea    [facility], [address], [place]    Telemedicine    2 years ago Physical exam, annual    [address] [facility], [place] Minnie Catching, MD    Office Visit                         Recent Outpatient Visits              2 months ago Pure hypercholesterolemia    [address], [facility], [place] Minnie Catching, MD    Office Visit    12 months ago Physical exam, annual    [address], [facility] [place] Minnie Catching, MD    Office Visit    2 years ago Major depressive disorder with single episode, in partial remission [facility])    [address], [facility], Rosemary Davila MD    Telemedicine    2 years ago Nausea    [facility], [address], [place]    Telemedicine    2 years ago Physical exam, annual    [address], [facility]    Office Visit

## 2023-11-24 RX ORDER — NADOLOL 20 MG/1
20 TABLET ORAL DAILY
Qty: 90 TABLET | Refills: 1 | Status: SHIPPED | OUTPATIENT
Start: 2023-11-24

## 2023-11-24 NOTE — TELEPHONE ENCOUNTER
Refill passed per [facility] protocol.     Requested Prescriptions   Pending Prescriptions Disp Refills    NADOLOL 20 MG Oral Tab [Pharmacy Med Name: Nadolol 20 Mg Tab [place]ays] 90 tablet 0     Sig: TAKE ONE TABLET BY MOUTH ONE TIME DAILY       Hypertensive Medications Protocol Passed - 11/23/2023  8:30 PM        Passed - In person appointment in the past 12 or next 3 months     Recent Outpatient Visits              3 months ago Pure hypercholesterolemia    [facility], [address], [place] Cherri Sabillon MD    Office Visit    1 year ago Physical exam, annual    [facility] [place] Kandel, Ninel, MD    Office Visit    2 years ago Major depressive disorder with single episode, in partial remission (HCC)    [facility] [place]    Telemedicine    2 years ago Nausea    [facility], Ольга Cabello APRN    Telemedicine    2 years ago Physical exam, annual    [facility] [place] Kandel, Ninel, MD    Office Visit                      Passed - Last BP reading less than 140/90     BP Readings from Last 1 Encounters:   07/28/23 131/74               Passed - CMP or BMP in past 6 months     Recent Results (from the past 4392 hour(s))   Comp Metabolic Panel (14)    Collection Time: 07/28/23 11:04 AM   Result Value Ref Range    Glucose 93 70 - 99 mg/dL    Sodium 140 136 - 145 mmol/L    Potassium 4.9 3.5 - 5.1 mmol/L    Chloride 105 98 - 112 mmol/L    CO2 32.0 21.0 - 32.0 mmol/L    Anion Gap 3 0 - 18 mmol/L    BUN 10 7 - 18 mg/dL    Creatinine 0.78 0.55 - 1.02 mg/dL    BUN/CREA Ratio 12.8 10.0 - 20.0    Calcium, Total 9.3 8.5 - 10.1 mg/dL    Calculated Osmolality 289 275 - 295 mOsm/kg    eGFR-Cr 87 >=60 mL/min/1.73m2    ALT 27 13 - 56 U/L    AST 26 15 - 37 U/L    Alkaline Phosphatase 66 46 - 118 U/L    Bilirubin, Total 0.5 0.1 - 2.0 mg/dL    Total Protein 8.1  6.4 - 8.2 g/dL    Albumin 4.0 3.4 - 5.0 g/dL    Globulin  4.1 2.8 - 4.4 g/dL    A/G Ratio 1.0 1.0 - 2.0    Patient Fasting for CMP? Yes      *Note: Due to a large number of results and/or encounters for the requested time period, some results have not been displayed. A complete set of results can be found in Results Review.               Passed - In person appointment or virtual visit in the past 6 months     Recent Outpatient Visits              3 months ago [facility]cholesterolemia    [facility]    Office Visit    1 year ago Physical exam, annual    [facility] [place] Kandel, Ninel, MD    Office Visit    2 years ago Major depressive disorder with single episode, in partial remission (HCC)    [facility] [place] Kandel, Ninel, MD    Telemedicine    2 years ago Nausea    [facility] Ольга Cabello APRN    Telemedicine    2 years ago Physical exam, annual    [facility] [place] Kandel, Ninel, MD    Office Visit                      Passed - EGFRCR or GFRNAA > 50     GFR Evaluation  EGFRCR: 87 , resulted on 7/28/2023                Recent Outpatient Visits              3 months ago Pure hypercholesterolemia    [facility] [place] Kandel, Ninel, MD    Office Visit    1 year ago Physical exam, annual    [facility]    Office Visit    2 years ago Major depressive disorder with single episode, in partial remission (HCC)    [facility] [place] Kandel, Ninel, MD    Telemedicine    2 years ago Nausea    [facility]    Telemedicine    2 years ago Physical exam, annual    [facility] [place] Kandel, Ninel, MD    Office  Visit

## 2023-12-14 RX ORDER — DOXEPIN HYDROCHLORIDE 10 MG/1
10 CAPSULE ORAL NIGHTLY
Qty: 90 CAPSULE | Refills: 0 | Status: SHIPPED | OUTPATIENT
Start: 2023-12-14

## 2023-12-14 NOTE — TELEPHONE ENCOUNTER
Please review. Protocol failed / No protocol. Requested Prescriptions   Pending Prescriptions Disp Refills    DOXEPIN 10 MG Oral Cap [Pharmacy Med Name: Doxepin Hydrochloride 10 Mg Cap Nort] 90 capsule 0     Sig: Take 1 capsule (10 mg total) by mouth nightly.        There is no refill protocol information for this order         Recent Outpatient Visits              4 months ago Pure hypercholesterolemia    [facility], [address], [place] Rudi Freeman, MD    Office Visit    1 year ago Physical exam, annual    [facility], [place], MD    Office Visit    2 years ago Major depressive disorder with single episode, in partial remission [facility])    [facility] Newton [facility] Emperor, [place], MD    Telemedicine    2 years ago [facility], [address], Gustavo Alarcon APRN    Telemedicine    2 years ago Physical exam, annual    [facility]ad Newton, Saray Crabtree MD    Office Visit

## 2024-01-16 ENCOUNTER — TELEPHONE (OUTPATIENT)
Dept: INTERNAL MEDICINE CLINIC | Facility: CLINIC | Age: 59
End: 2024-01-16

## 2024-01-16 NOTE — TELEPHONE ENCOUNTER
Patient is requesting appointment to see Dr. Sabillon for medication follow up. She states she does not believe Doxepin is as effective anymore as she has been more nervous lately.    Appointment scheduled:    Future Appointments   Date Time Provider Department Center   1/19/2024  4:40 PM [facility] [place]

## 2024-01-19 ENCOUNTER — PATIENT MESSAGE (OUTPATIENT)
Dept: INTERNAL MEDICINE CLINIC | Facility: CLINIC | Age: 59
End: 2024-01-19

## 2024-01-20 NOTE — TELEPHONE ENCOUNTER
Patient called and said she can try the trazodone first, would like original script for Quetiapine to be canceled.   Deann Cheung

## 2024-01-24 ENCOUNTER — PATIENT MESSAGE (OUTPATIENT)
Dept: INTERNAL MEDICINE CLINIC | Facility: CLINIC | Age: 59
End: 2024-01-24

## 2024-01-24 NOTE — TELEPHONE ENCOUNTER
Patient called back on this.  She now does not want to take seroquel OR trazodone.  She feels strongly that her symptoms are related to vertigo, not anxiety.  She is asking Dr. Sabillon to consider this diagnosis, is willing to come in for recheck if Dr. Sabillon prefers.  Please advise.

## 2024-01-25 ENCOUNTER — NURSE TRIAGE (OUTPATIENT)
Dept: INTERNAL MEDICINE CLINIC | Facility: CLINIC | Age: 59
End: 2024-01-25
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
## 2024-01-27 ENCOUNTER — LAB ENCOUNTER (OUTPATIENT)
Dept: LAB | Age: 59
End: 2024-01-27
Attending: INTERNAL MEDICINE
Payer: COMMERCIAL

## 2024-01-27 ENCOUNTER — OFFICE VISIT (OUTPATIENT)
Dept: INTERNAL MEDICINE CLINIC | Facility: CLINIC | Age: 59
End: 2024-01-27
Payer: COMMERCIAL

## 2024-01-27 VITALS
HEART RATE: 61 BPM | DIASTOLIC BLOOD PRESSURE: 89 MMHG | WEIGHT: 141 LBS | BODY MASS INDEX: 22.66 KG/M2 | HEIGHT: 66 IN | SYSTOLIC BLOOD PRESSURE: 131 MMHG | RESPIRATION RATE: 18 BRPM

## 2024-01-27 DIAGNOSIS — E55.9 VITAMIN D DEFICIENCY: ICD-10-CM

## 2024-01-27 DIAGNOSIS — E03.8 TSH (THYROID-STIMULATING HORMONE DEFICIENCY): ICD-10-CM

## 2024-01-27 DIAGNOSIS — E78.49 OTHER HYPERLIPIDEMIA: ICD-10-CM

## 2024-01-27 DIAGNOSIS — R42 DIZZINESS: Primary | ICD-10-CM

## 2024-01-27 DIAGNOSIS — R42 DIZZINESS: ICD-10-CM

## 2024-01-27 LAB
ALBUMIN SERPL-MCNC: 4.6 G/DL (ref 3.2–4.8)
ALBUMIN/GLOB SERPL: 1.5 {RATIO} (ref 1–2)
ALP LIVER SERPL-CCNC: 80 U/L
ALT SERPL-CCNC: 12 U/L
ANION GAP SERPL CALC-SCNC: 6 MMOL/L (ref 0–18)
AST SERPL-CCNC: 22 U/L (ref ?–34)
BASOPHILS # BLD AUTO: 0.07 X10(3) UL (ref 0–0.2)
BASOPHILS NFR BLD AUTO: 1.1 %
BILIRUB SERPL-MCNC: 0.3 MG/DL (ref 0.3–1.2)
BUN BLD-MCNC: 7 MG/DL (ref 9–23)
BUN/CREAT SERPL: 9 (ref 10–20)
CALCIUM BLD-MCNC: 9.7 MG/DL (ref 8.7–10.4)
CHLORIDE SERPL-SCNC: 105 MMOL/L (ref 98–112)
CHOLEST SERPL-MCNC: 181 MG/DL (ref ?–200)
CO2 SERPL-SCNC: 31 MMOL/L (ref 21–32)
CREAT BLD-MCNC: 0.78 MG/DL
DEPRECATED RDW RBC AUTO: 38.1 FL (ref 35.1–46.3)
EGFRCR SERPLBLD CKD-EPI 2021: 87 ML/MIN/1.73M2 (ref 60–?)
EOSINOPHIL # BLD AUTO: 0.37 X10(3) UL (ref 0–0.7)
EOSINOPHIL NFR BLD AUTO: 5.7 %
ERYTHROCYTE [DISTWIDTH] IN BLOOD BY AUTOMATED COUNT: 12.6 % (ref 11–15)
FASTING PATIENT LIPID ANSWER: YES
FASTING STATUS PATIENT QL REPORTED: YES
GLOBULIN PLAS-MCNC: 3.1 G/DL (ref 2.8–4.4)
GLUCOSE BLD-MCNC: 98 MG/DL (ref 70–99)
HCT VFR BLD AUTO: 39.1 %
HDLC SERPL-MCNC: 41 MG/DL (ref 40–59)
HGB BLD-MCNC: 12 G/DL
IMM GRANULOCYTES # BLD AUTO: 0.01 X10(3) UL (ref 0–1)
IMM GRANULOCYTES NFR BLD: 0.2 %
LDLC SERPL CALC-MCNC: 110 MG/DL (ref ?–100)
LYMPHOCYTES # BLD AUTO: 1.8 X10(3) UL (ref 1–4)
LYMPHOCYTES NFR BLD AUTO: 27.7 %
MCH RBC QN AUTO: 25.6 PG (ref 26–34)
MCHC RBC AUTO-ENTMCNC: 30.7 G/DL (ref 31–37)
MCV RBC AUTO: 83.4 FL
MONOCYTES # BLD AUTO: 0.6 X10(3) UL (ref 0.1–1)
MONOCYTES NFR BLD AUTO: 9.2 %
NEUTROPHILS # BLD AUTO: 3.64 X10 (3) UL (ref 1.5–7.7)
NEUTROPHILS # BLD AUTO: 3.64 X10(3) UL (ref 1.5–7.7)
NEUTROPHILS NFR BLD AUTO: 56.1 %
NONHDLC SERPL-MCNC: 140 MG/DL (ref ?–130)
OSMOLALITY SERPL CALC.SUM OF ELEC: 292 MOSM/KG (ref 275–295)
PLATELET # BLD AUTO: 254 10(3)UL (ref 150–450)
POTASSIUM SERPL-SCNC: 5.2 MMOL/L (ref 3.5–5.1)
PROT SERPL-MCNC: 7.7 G/DL (ref 5.7–8.2)
RBC # BLD AUTO: 4.69 X10(6)UL
SODIUM SERPL-SCNC: 142 MMOL/L (ref 136–145)
T3FREE SERPL-MCNC: 3.28 PG/ML (ref 2.4–4.2)
T4 FREE SERPL-MCNC: 1.3 NG/DL (ref 0.8–1.7)
TRIGL SERPL-MCNC: 171 MG/DL (ref 30–149)
TSI SER-ACNC: 0.09 MIU/ML (ref 0.55–4.78)
VIT D+METAB SERPL-MCNC: 24.9 NG/ML (ref 30–100)
VLDLC SERPL CALC-MCNC: 29 MG/DL (ref 0–30)
WBC # BLD AUTO: 6.5 X10(3) UL (ref 4–11)

## 2024-01-27 PROCEDURE — 82306 VITAMIN D 25 HYDROXY: CPT

## 2024-01-27 PROCEDURE — 99214 OFFICE O/P EST MOD 30 MIN: CPT | Performed by: INTERNAL MEDICINE

## 2024-01-27 PROCEDURE — 84439 ASSAY OF FREE THYROXINE: CPT

## 2024-01-27 PROCEDURE — 84481 FREE ASSAY (FT-3): CPT

## 2024-01-27 PROCEDURE — 84443 ASSAY THYROID STIM HORMONE: CPT

## 2024-01-27 PROCEDURE — 3008F BODY MASS INDEX DOCD: CPT | Performed by: INTERNAL MEDICINE

## 2024-01-27 PROCEDURE — 3075F SYST BP GE 130 - 139MM HG: CPT | Performed by: INTERNAL MEDICINE

## 2024-01-27 PROCEDURE — 80053 COMPREHEN METABOLIC PANEL: CPT

## 2024-01-27 PROCEDURE — 36415 COLL VENOUS BLD VENIPUNCTURE: CPT

## 2024-01-27 PROCEDURE — 85025 COMPLETE CBC W/AUTO DIFF WBC: CPT

## 2024-01-27 PROCEDURE — 3079F DIAST BP 80-89 MM HG: CPT | Performed by: INTERNAL MEDICINE

## 2024-01-27 PROCEDURE — 80061 LIPID PANEL: CPT
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
## 2024-02-02 ENCOUNTER — OFFICE VISIT (OUTPATIENT)
Dept: AUDIOLOGY | Facility: CLINIC | Age: 59
End: 2024-02-02
Payer: COMMERCIAL

## 2024-02-02 ENCOUNTER — OFFICE VISIT (OUTPATIENT)
Dept: OTOLARYNGOLOGY | Facility: CLINIC | Age: 59
End: 2024-02-02

## 2024-02-02 DIAGNOSIS — R42 DIZZINESS: Primary | ICD-10-CM

## 2024-02-02 PROCEDURE — 99203 OFFICE O/P NEW LOW 30 MIN: CPT | Performed by: OTOLARYNGOLOGY

## 2024-02-02 PROCEDURE — 92557 COMPREHENSIVE HEARING TEST: CPT | Performed by: AUDIOLOGIST

## 2024-02-02 PROCEDURE — 92567 TYMPANOMETRY: CPT | Performed by: AUDIOLOGIST
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
## 2024-02-04 ENCOUNTER — TELEPHONE (OUTPATIENT)
Dept: INTERNAL MEDICINE CLINIC | Facility: CLINIC | Age: 59
End: 2024-02-04

## 2024-02-04 NOTE — TELEPHONE ENCOUNTER
Dr. Sabillon, please see update from patient and advise. Thanks.    Per last visit:    Assessment & Plan:   1. Anxiety uncontrolled, we discussed possibility of increasing Effexor, continue citalopram, will try at night instead of duloxetine and Seroquel start from 12.5 mg at night for couple days and increase to 25 mg at night, advised patient to report in 1 to 2 weeks how she is doing with medications     Mychart sent to inquire how she is doing on medications.

## 2024-02-05 ENCOUNTER — TELEPHONE (OUTPATIENT)
Dept: INTERNAL MEDICINE CLINIC | Facility: CLINIC | Age: 59
End: 2024-02-05

## 2024-02-05 NOTE — TELEPHONE ENCOUNTER
Spoke to patient, we decided to increase venlafaxine she will take 75 mg daily decrease levothyroxine take 100 mcg 5 days a week, take 1 daily one half a tablet only, and skip 1 day recheck TSH in 6 weeks

## 2024-03-02 RX ORDER — DOXEPIN HYDROCHLORIDE 10 MG/1
10 CAPSULE ORAL NIGHTLY
Qty: 90 CAPSULE | Refills: 3 | Status: SHIPPED | OUTPATIENT
Start: 2024-03-02

## 2024-03-02 NOTE — TELEPHONE ENCOUNTER
Protocol Failed/ No Protocol    Requested Prescriptions   Pending Prescriptions Disp Refills    doxepin 10 MG Oral Cap 90 capsule 3     Sig: Take 1 capsule (10 mg total) by mouth nightly.       There is no refill protocol information for this order            Recent Outpatient Visits              4 weeks ago Dizziness    [facility] [place] Merly Kim MS, CCC-A    Office Visit    4 weeks ago Dizziness    [facility] [place] Ben Vang MD    Office Visit    1 month ago Dizziness    [facility] [place] Kandel, Ninel, MD    Office Visit    1 month ago Anxiety    [facility] [place] Kandel, Ninel, MD    Office Visit    7 months ago Pure hypercholesterolemia    [facility] [place] Kandel, Ninel, MD    Office Visit

## 2024-04-18 ENCOUNTER — TELEPHONE (OUTPATIENT)
Dept: INTERNAL MEDICINE CLINIC | Facility: CLINIC | Age: 59
End: 2024-04-18

## 2024-04-18 DIAGNOSIS — Z11.1 SCREENING-PULMONARY TB: Primary | ICD-10-CM

## 2024-04-18 NOTE — TELEPHONE ENCOUNTER
Order placed for QuantiFERON test, remind patient that she should do thyroid function test that is already in the system as well.

## 2024-05-10 ENCOUNTER — HOSPITAL ENCOUNTER (OUTPATIENT)
Dept: MAMMOGRAPHY | Age: 59
Discharge: HOME | End: 2024-05-10
Attending: INTERNAL MEDICINE
Payer: COMMERCIAL

## 2024-05-10 ENCOUNTER — LAB ENCOUNTER (OUTPATIENT)
Dept: LAB | Age: 59
End: 2024-05-10
Attending: INTERNAL MEDICINE

## 2024-05-10 DIAGNOSIS — Z11.1 SCREENING-PULMONARY TB: ICD-10-CM

## 2024-05-10 DIAGNOSIS — Z12.31 SCREENING MAMMOGRAM FOR BREAST CANCER: ICD-10-CM

## 2024-05-10 DIAGNOSIS — E03.8 OTHER SPECIFIED HYPOTHYROIDISM: ICD-10-CM

## 2024-05-10 LAB — TSI SER-ACNC: 0.32 MIU/ML (ref 0.55–4.78)

## 2024-05-10 PROCEDURE — 77067 SCR MAMMO BI INCL CAD: CPT | Performed by: INTERNAL MEDICINE

## 2024-05-10 PROCEDURE — 77063 BREAST TOMOSYNTHESIS BI: CPT | Performed by: INTERNAL MEDICINE

## 2024-05-10 PROCEDURE — 86480 TB TEST CELL IMMUN MEASURE: CPT

## 2024-05-10 PROCEDURE — 84443 ASSAY THYROID STIM HORMONE: CPT

## 2024-05-10 PROCEDURE — 36415 COLL VENOUS BLD VENIPUNCTURE: CPT

## 2024-05-12 LAB
M TB IFN-G CD4+ T-CELLS BLD-ACNC: 0.05 IU/ML
M TB TUBERC IFN-G BLD QL: POSITIVE
M TB TUBERC IGNF/MITOGEN IGNF CONTROL: >10 IU/ML
QFT TB1 AG MINUS NIL: 0.53 IU/ML
QFT TB2 AG MINUS NIL: 0.67 IU/ML

## 2024-05-25 RX ORDER — NADOLOL 20 MG/1
20 TABLET ORAL DAILY
Qty: 90 TABLET | Refills: 3 | Status: SHIPPED | OUTPATIENT
Start: 2024-05-25

## 2024-05-26 NOTE — TELEPHONE ENCOUNTER
REFILL PASSED PER [facility] PROTOCOLS    Requested Prescriptions   Pending Prescriptions Disp Refills    NADOLOL 20 MG Oral Tab [Pharmacy Med Name: Nadolol 20 Mg Tab [place]] 90 tablet 0     Sig: Take 1 tablet (20 mg total) by mouth daily.       Hypertension Medications Protocol Passed - 5/22/2024  6:03 PM        Passed - CMP or BMP in past 12 months        Passed - Last BP reading less than 140/90     BP Readings from Last 1 Encounters:   05/10/24 132/83               Passed - In person appointment or virtual visit in the past 12 mos or appointment in next 3 mos     Recent Outpatient Visits              2 weeks ago Anxiety    [facility] [place] Kandel, Ninel, MD    Office Visit    3 months [facility]    Office Visit    3 months ago Dizziness    [facility] Ben Garrido MD    Office Visit    3 months ago Dizziness    [facility] [place] Kandel, Ninel, MD    Office Visit    4 months ago Anxiety    [facility] [place] Kandel, Ninel, MD    Office Visit                      Passed - EGFRCR or GFRNAA > 50     GFR Evaluation  EGFRCR: 87 , resulted on 1/27/2024                 Recent Outpatient Visits              2 weeks ago Anxiety    [facility] [place] Kandel, Ninel, MD    Office Visit    3 months ago [facility]    Office Visit    3 months ago [facility]    [facility] Visit    3 months ago Dizziness    [facility] [place] Kandel, Ninel, MD    Office Visit    4 months ago Anxiety    [facility] [place] Kandel, Ninel, MD    Office Visit

## 2024-06-05 RX ORDER — ATORVASTATIN CALCIUM 20 MG/1
20 TABLET, FILM COATED ORAL NIGHTLY
Qty: 90 TABLET | Refills: 3 | Status: SHIPPED | OUTPATIENT
Start: 2024-06-05

## 2024-06-05 NOTE — TELEPHONE ENCOUNTER
Refill passed per [facility] protocol.  Requested Prescriptions   Pending Prescriptions Disp Refills    ATORVASTATIN 20 MG Oral Tab [Pharmacy Med Name: Atorvastatin Calcium 20 Mg Tab [place]] 90 tablet 0     Sig: Take 1 tablet (20 mg total) by mouth nightly.       Cholesterol Medication Protocol Passed - 6/1/2024  8:54 PM        Passed - ALT < 80     Lab Results   Component Value Date    ALT 12 01/27/2024             Passed - ALT resulted within past year        Passed - Lipid panel within past 12 months     Lab Results   Component Value Date    CHOLEST 181 01/27/2024    TRIG 171 (H) 01/27/2024    HDL 41 01/27/2024     (H) 01/27/2024    VLDL 29 01/27/2024    NONHDLC 140 (H) 01/27/2024             Passed - In person appointment or virtual visit in the past 12 mos or appointment in next 3 mos     Recent Outpatient Visits              3 weeks ago Anxiety    [facility] [place] Kandel, Ninel, MD    Office Visit    4 months ago [facility]    Office Visit    4 months ago [facility] [place]    Office Visit    4 months ago Dizziness    [facility] [place] Kandel, Ninel, MD    Office Visit    4 months ago Anxiety    [facility] [place] Kandel, Ninel, MD    Office Visit                         Recent Outpatient Visits              3 weeks ago Anxiety    [facility] [place] Kandel, Ninel, MD    Office Visit    4 months ago [facility]    Office Visit    4 months ago [facility]    [facility] Visit    4 months ago Dizziness    [facility] [place] Kandel, Ninel, MD    Office  Visit    4 months ago Anxiety    [facility] [place] Cherri Sabillon MD    Office Visit

## 2024-06-06 RX ORDER — ATORVASTATIN CALCIUM 20 MG/1
20 TABLET, FILM COATED ORAL NIGHTLY
Qty: 90 TABLET | Refills: 3 | OUTPATIENT
Start: 2024-06-06

## 2024-06-14 RX ORDER — LEVOTHYROXINE SODIUM 0.1 MG/1
100 TABLET ORAL DAILY
Qty: 90 TABLET | Refills: 1 | Status: SHIPPED | OUTPATIENT
Start: 2024-06-14

## 2024-06-14 NOTE — TELEPHONE ENCOUNTER
Please review. Protocol Failed; No Protocol    Requested Prescriptions   Pending Prescriptions Disp Refills    LEVOTHYROXINE 100 MCG Oral Tab [Pharmacy Med Name: Levothyroxine Sodium 100 Mcg Tab Lupi] 90 tablet 0     Sig: Take 1 tablet (100 mcg total) by mouth daily.       Thyroid Medication Protocol Failed - 6/10/2024  5:23 PM        Failed - Last TSH value is normal     Lab Results   Component Value Date    TSH 0.324 (L) 05/10/2024    THYROIDFUNC 0.10 (L) 12/26/2015                 Passed - TSH in past 12 months        Passed - In person appointment or virtual visit in the past 12 mos or appointment in next 3 mos     Recent Outpatient Visits              1 month ago Anxiety    [facility] [place] Kandel, Ninel, MD    Office Visit    4 months [facility]    [facility]    4 months ago [facility]    [facility]    4 months ago Dizziness    [facility] [place] Kandel, Ninel, MD    Office Visit    4 months ago Anxiety    [facility] [place] Kandel, Ninel, MD    Office Visit                               Recent Outpatient Visits              1 month ago Anxiety    [facility] [place] Kandel, Ninel, MD    Office Visit    4 months ago Dizziness    [facility] Merly Arrington MS, CCC-A    Office Visit    4 months ago [facility]izz[facility]    [facility] Visit    4 months ago Dizziness    [facility] [place] Kandel, Ninel, MD    Office Visit    4 months ago Anxiety    [facility] [place] Kandel, Ninel, MD    Office Visit

## 2024-08-04 ENCOUNTER — HOSPITAL ENCOUNTER (EMERGENCY)
Facility: HOSPITAL | Age: 59
Discharge: HOME | End: 2024-08-04
Attending: EMERGENCY MEDICINE
Payer: COMMERCIAL

## 2024-08-04 VITALS
HEIGHT: 66 IN | BODY MASS INDEX: 23.3 KG/M2 | HEART RATE: 73 BPM | OXYGEN SATURATION: 96 % | RESPIRATION RATE: 18 BRPM | SYSTOLIC BLOOD PRESSURE: 155 MMHG | WEIGHT: 145 LBS | TEMPERATURE: 98 F | DIASTOLIC BLOOD PRESSURE: 83 MMHG

## 2024-08-04 DIAGNOSIS — R11.2 NAUSEA AND VOMITING IN ADULT: Primary | ICD-10-CM

## 2024-08-04 DIAGNOSIS — K52.9 GASTROENTERITIS: ICD-10-CM

## 2024-08-04 LAB
ALBUMIN SERPL-MCNC: 5.8 G/DL (ref 3.2–4.8)
ALBUMIN/GLOB SERPL: 1.7 {RATIO} (ref 1–2)
ALP LIVER SERPL-CCNC: 110 U/L
ALT SERPL-CCNC: 22 U/L
ANION GAP SERPL CALC-SCNC: 7 MMOL/L (ref 0–18)
AST SERPL-CCNC: 29 U/L (ref ?–34)
BASOPHILS # BLD AUTO: 0.04 X10(3) UL (ref 0–0.2)
BASOPHILS NFR BLD AUTO: 0.3 %
BILIRUB SERPL-MCNC: 0.4 MG/DL (ref 0.2–1.1)
BUN BLD-MCNC: 9 MG/DL (ref 9–23)
CALCIUM BLD-MCNC: 10.4 MG/DL (ref 8.7–10.4)
CHLORIDE SERPL-SCNC: 99 MMOL/L (ref 98–112)
CO2 SERPL-SCNC: 28 MMOL/L (ref 21–32)
CREAT BLD-MCNC: 0.76 MG/DL
EGFRCR SERPLBLD CKD-EPI 2021: 90 ML/MIN/1.73M2 (ref 60–?)
EOSINOPHIL # BLD AUTO: 0.01 X10(3) UL (ref 0–0.7)
EOSINOPHIL NFR BLD AUTO: 0.1 %
ERYTHROCYTE [DISTWIDTH] IN BLOOD BY AUTOMATED COUNT: 13.2 %
GLOBULIN PLAS-MCNC: 3.5 G/DL (ref 2–3.5)
GLUCOSE BLD-MCNC: 164 MG/DL (ref 70–99)
HCT VFR BLD AUTO: 46.7 %
HGB BLD-MCNC: 15 G/DL
IMM GRANULOCYTES # BLD AUTO: 0.05 X10(3) UL (ref 0–1)
IMM GRANULOCYTES NFR BLD: 0.4 %
LYMPHOCYTES # BLD AUTO: 1.16 X10(3) UL (ref 1–4)
LYMPHOCYTES NFR BLD AUTO: 8.9 %
MCH RBC QN AUTO: 26.4 PG (ref 26–34)
MCHC RBC AUTO-ENTMCNC: 32.1 G/DL (ref 31–37)
MCV RBC AUTO: 82.1 FL
MONOCYTES # BLD AUTO: 0.35 X10(3) UL (ref 0.1–1)
MONOCYTES NFR BLD AUTO: 2.7 %
NEUTROPHILS # BLD AUTO: 11.36 X10 (3) UL (ref 1.5–7.7)
NEUTROPHILS # BLD AUTO: 11.36 X10(3) UL (ref 1.5–7.7)
NEUTROPHILS NFR BLD AUTO: 87.6 %
OSMOLALITY SERPL CALC.SUM OF ELEC: 280 MOSM/KG (ref 275–295)
PLATELET # BLD AUTO: 332 10(3)UL (ref 150–450)
POTASSIUM SERPL-SCNC: 4.2 MMOL/L (ref 3.5–5.1)
PROT SERPL-MCNC: 9.3 G/DL (ref 5.7–8.2)
RBC # BLD AUTO: 5.69 X10(6)UL
SODIUM SERPL-SCNC: 134 MMOL/L (ref 136–145)
WBC # BLD AUTO: 13 X10(3) UL (ref 4–11)

## 2024-08-04 PROCEDURE — 85025 COMPLETE CBC W/AUTO DIFF WBC: CPT | Performed by: EMERGENCY MEDICINE

## 2024-08-04 PROCEDURE — 80053 COMPREHEN METABOLIC PANEL: CPT | Performed by: EMERGENCY MEDICINE

## 2024-08-04 PROCEDURE — 96361 HYDRATE IV INFUSION ADD-ON: CPT

## 2024-08-04 PROCEDURE — 99284 EMERGENCY DEPT VISIT MOD MDM: CPT

## 2024-08-04 PROCEDURE — 96376 TX/PRO/DX INJ SAME DRUG ADON: CPT

## 2024-08-04 PROCEDURE — 96375 TX/PRO/DX INJ NEW DRUG ADDON: CPT

## 2024-08-04 PROCEDURE — 96374 THER/PROPH/DIAG INJ IV PUSH: CPT

## 2024-08-04 RX ORDER — ONDANSETRON 2 MG/ML
INJECTION INTRAMUSCULAR; INTRAVENOUS
Status: COMPLETED
Start: 2024-08-04 | End: 2024-08-04

## 2024-08-04 RX ORDER — DIPHENHYDRAMINE HYDROCHLORIDE 50 MG/ML
25 INJECTION INTRAMUSCULAR; INTRAVENOUS ONCE
Status: COMPLETED | OUTPATIENT
Start: 2024-08-04 | End: 2024-08-04

## 2024-08-04 RX ORDER — ONDANSETRON 2 MG/ML
4 INJECTION INTRAMUSCULAR; INTRAVENOUS ONCE
Status: COMPLETED | OUTPATIENT
Start: 2024-08-04 | End: 2024-08-04

## 2024-08-04 RX ORDER — ONDANSETRON 4 MG/1
4 TABLET, ORALLY DISINTEGRATING ORAL EVERY 4 HOURS PRN
Qty: 10 TABLET | Refills: 0 | Status: SHIPPED | OUTPATIENT
Start: 2024-08-04 | End: 2024-08-11

## 2024-08-04 RX ORDER — METOCLOPRAMIDE HYDROCHLORIDE 5 MG/ML
10 INJECTION INTRAMUSCULAR; INTRAVENOUS ONCE
Status: COMPLETED | OUTPATIENT
Start: 2024-08-04 | End: 2024-08-04

## 2024-08-04 NOTE — ED PROVIDER NOTES
Patient Seen in: [facility] Emergency Department      History     Chief Complaint   Patient presents with    Nausea/Vomiting/Diarrhea     Stated Complaint: nausea and vomiting    Subjective:   HPI    ***    Objective:   Past Medical History:    Anxiety    Cancer (HCC)    Disorder of thyroid    Exposure to medical diagnostic radiation    at age 26    Thyroid cancer (HCC)    Thyroid disease    Visual impairment    glasses              Past Surgical History:   Procedure Laterality Date    Colonoscopy N/A 5/27/2022    Procedure: COLONOSCOPY WITH COLD SNARE POLYPECTOMY;  Surgeon: Archie Braxton MD;  Location:  ENDOSCOPY    Thyroid ablation, carcinoma      Tonsillectomy      Treat ectopic preg,rmv tube/ovary                  Social History     Socioeconomic History    Marital status:    Tobacco Use    Smoking status: Former     Current packs/day: 0.00     Average packs/day: 1 pack/day for 12.0 years (12.0 ttl pk-yrs)     Types: Cigarettes     Start date: 8/21/2001     Quit date: 8/21/2013     Years since quitting: 10.9    Smokeless tobacco: Never   Vaping Use    Vaping status: Never Used   Substance and Sexual Activity    Alcohol use: No    Drug use: Yes     Types: Cannabis     Comment: occasional   Other Topics Concern    Caffeine Concern Yes     Comment: soda, coffee, 1 cup daily    Left Handed No    Right Handed Yes    Currently spends a great deal of time in the sun No    History of tanning No    Hx of Spending Great Deal of Time in Sun No    Bad sunburns in the past No    Tanning Salons in the Past No    Hx of Radiation Treatments Yes              Review of Systems    Positive for stated Chief Complaint: Nausea/Vomiting/Diarrhea    Other systems are as noted in HPI.  Constitutional and vital signs reviewed.      All other systems reviewed and negative except as noted above.    Physical Exam     ED Triage Vitals   BP 08/04/24 0638 (!) 179/98   Pulse 08/04/24 0638 79   Resp 08/04/24 0638 17   Temp  08/04/24 0638 98.3 °F (36.8 °C)   Temp src 08/04/24 0638 Oral   SpO2 08/04/24 0737 97 %   O2 Device 08/04/24 0638 None (Room air)       Current Vitals:   Vital Signs  BP: 155/83  Pulse: 73  Resp: 18  Temp: 98.3 °F (36.8 °C)  Temp src: Oral    Oxygen Therapy  SpO2: 96 %  O2 Device: None (Room air)            Physical Exam    ***      ED Course     Labs Reviewed   COMP METABOLIC PANEL (14) - Abnormal; Notable for the following components:       Result Value    Glucose 164 (*)     Sodium 134 (*)     Total Protein 9.3 (*)     Albumin 5.8 (*)     All other components within normal limits   CBC W/ DIFFERENTIAL - Abnormal; Notable for the following components:    WBC 13.0 (*)     RBC 5.69 (*)     Neutrophil Absolute Prelim 11.36 (*)     Neutrophil Absolute 11.36 (*)     All other components within normal limits   CBC WITH DIFFERENTIAL WITH PLATELET    Narrative:     The following orders were created for panel order CBC With Differential With Platelet.  Procedure                               Abnormality         Status                     ---------                               -----------         ------                     CBC W/ DIFFERENTIAL[[id number]]          Abnormal            Final result                 Please view results for these tests on the individual orders.   RAINBOW DRAW BLUE   RAINBOW DRAW GOLD             ***         MDM      ***                                   MDM    Disposition and Plan     Clinical Impression:  No diagnosis found.     Disposition:  There is no disposition on file for this visit.  There is no disposition time on file for this visit.    Follow-up:  No follow-up provider specified.        Medications Prescribed:  Current Discharge Medication List                                            Abnormal            Final result                 Please view results for these tests on the individual orders.   RAINBOW DRAW BLUE   RAINBOW DRAW GOLD                      [facility]      Medical Decision Making:    Differential diagnosis before testing includes electrolyte abnormality, gastroenteritis potential life threatening diagnosis which is a medical condition that poses a threat to life/function.     Comorbidities that add complexity to management: History of thyroid cancer    I reviewed prior external ED notes including history of latent TB per note 7/19    Shared decision making:    Given Zofran x 2 and IV fluids, no relief.  Next given Reglan, Benadryl and a second liter of fluids.  After that she felt much better and was asking to DC home.  Given prescription for Zofran for home.  Likely gastroenteritis.                               Medical Decision Making      Disposition and Plan     Clinical Impression:  1. Nausea and vomiting in adult    2. Gastroenteritis         Disposition:  Discharge  8/4/2024  9:29 am    Follow-up:  Cherri Sabillon MD  [address] [place] [id number] [phone]    Follow up in 2 day(s)            Medications Prescribed:  Discharge Medication List as of 8/4/2024  9:44 AM        START taking these medications    Details   ondansetron 4 MG Oral Tablet Dispersible Take 1 tablet (4 mg total) by mouth every 4 (four) hours as needed for Nausea., Normal, Disp-10 tablet, R-0

## 2024-08-04 NOTE — ED INITIAL ASSESSMENT (HPI)
Patient to ED via EMS for nausea and vomiting. Patient ate cheese pizza and a fresh tomato yesterday for dinner and began vomiting afterwards.

## 2024-08-04 NOTE — ED QUICK NOTES
Pt feeling a little better, pt resting, second bag of IVF infusing, pt has no needs at this time.

## 2024-08-06 ENCOUNTER — TELEPHONE (OUTPATIENT)
Dept: INTERNAL MEDICINE CLINIC | Facility: CLINIC | Age: 59
End: 2024-08-06

## 2024-08-06 NOTE — TELEPHONE ENCOUNTER
Dr. Sabillon, can you please clarify \"she should continue to have her drain check, take injury of the foot (Per your note below)    thanks

## 2024-08-06 NOTE — TELEPHONE ENCOUNTER
Please advise patient that she should continue to have her drain check, take injury of the foot soup type diet for today keep appointment tomorrow with nurse practitioner

## 2024-08-06 NOTE — TELEPHONE ENCOUNTER
Patient was in ER 8/4/24 with GI symptoms  These have resolved, she was feeling a bit better yesterday. Took advil prior to bed, patient has no fever. Woke up this morning sweating and sheets were wet from sweat.     Temp now at home 97.6, no meds this morning.   Eating BRAT diet and tolerating that. Last moved bowels yesterday, runny not frequent.   No nausea, vomiting or pain anywhere today.     Main complaint \"Just feeling weak and tired\". Able to get and walk around. No CP, SOB or dizziness noted. Patient wanting to know what else if anything she can be doing.   Yesterday drank 3-4 bottles of water and some Gatorade.     Advised need to push fluids, water mostly but some gatorade again today but don't over do it. Advised she continue brat diet (food reviewed with her to eat and limit) and scheduled for follow up tomorrow with NP at [facility]     Reviewed labs, WBC was slightly elevated along with a few other abnormal values - would you like to check anything prior to visit or any other recommendations?     Future Appointments   Date Time Provider Department Center   8/7/2024 11:30 AM Wendy Bueno APRN [facility]     ER warning signs reviewed and she states understanding and is agreeable to go if worsening, change or new symptoms. Advised can call us if questions.

## 2024-08-06 NOTE — TELEPHONE ENCOUNTER
Excuse me for mistaken dictation, please notify patient to continue bland diet ,soup type diet until tomorrow stay hydrated, and follow-up tomorrow with nurse practitioner

## 2024-08-07 ENCOUNTER — LAB ENCOUNTER (OUTPATIENT)
Dept: LAB | Age: 59
End: 2024-08-07
Payer: COMMERCIAL

## 2024-08-07 ENCOUNTER — OFFICE VISIT (OUTPATIENT)
Dept: INTERNAL MEDICINE CLINIC | Facility: CLINIC | Age: 59
End: 2024-08-07
Payer: COMMERCIAL

## 2024-08-07 VITALS
HEIGHT: 66 IN | OXYGEN SATURATION: 98 % | DIASTOLIC BLOOD PRESSURE: 84 MMHG | SYSTOLIC BLOOD PRESSURE: 128 MMHG | WEIGHT: 141 LBS | HEART RATE: 58 BPM | BODY MASS INDEX: 22.66 KG/M2

## 2024-08-07 DIAGNOSIS — R06.2 WHEEZING: ICD-10-CM

## 2024-08-07 DIAGNOSIS — R53.83 FATIGUE, UNSPECIFIED TYPE: ICD-10-CM

## 2024-08-07 DIAGNOSIS — K52.9 GASTROENTERITIS: Primary | ICD-10-CM

## 2024-08-07 DIAGNOSIS — K52.9 GASTROENTERITIS: ICD-10-CM

## 2024-08-07 LAB
ALBUMIN SERPL-MCNC: 5.2 G/DL (ref 3.2–4.8)
ALBUMIN/GLOB SERPL: 1.7 {RATIO} (ref 1–2)
ALP LIVER SERPL-CCNC: 90 U/L
ALT SERPL-CCNC: 21 U/L
ANION GAP SERPL CALC-SCNC: 4 MMOL/L (ref 0–18)
AST SERPL-CCNC: 36 U/L (ref ?–34)
BILIRUB SERPL-MCNC: 0.4 MG/DL (ref 0.2–1.1)
BUN BLD-MCNC: 15 MG/DL (ref 9–23)
BUN/CREAT SERPL: 16.1 (ref 10–20)
CALCIUM BLD-MCNC: 10.2 MG/DL (ref 8.7–10.4)
CHLORIDE SERPL-SCNC: 102 MMOL/L (ref 98–112)
CO2 SERPL-SCNC: 31 MMOL/L (ref 21–32)
CREAT BLD-MCNC: 0.93 MG/DL
EGFRCR SERPLBLD CKD-EPI 2021: 70 ML/MIN/1.73M2 (ref 60–?)
FASTING STATUS PATIENT QL REPORTED: YES
GLOBULIN PLAS-MCNC: 3 G/DL (ref 2–3.5)
GLUCOSE BLD-MCNC: 105 MG/DL (ref 70–99)
OSMOLALITY SERPL CALC.SUM OF ELEC: 285 MOSM/KG (ref 275–295)
POTASSIUM SERPL-SCNC: 5.2 MMOL/L (ref 3.5–5.1)
PROT SERPL-MCNC: 8.2 G/DL (ref 5.7–8.2)
SODIUM SERPL-SCNC: 137 MMOL/L (ref 136–145)

## 2024-08-07 PROCEDURE — 36415 COLL VENOUS BLD VENIPUNCTURE: CPT

## 2024-08-07 PROCEDURE — 80053 COMPREHEN METABOLIC PANEL: CPT

## 2024-08-07 PROCEDURE — 99213 OFFICE O/P EST LOW 20 MIN: CPT

## 2024-08-07 RX ORDER — ALBUTEROL SULFATE 90 UG/1
2 AEROSOL, METERED RESPIRATORY (INHALATION) EVERY 4 HOURS PRN
Qty: 3 EACH | Refills: 3 | Status: SHIPPED | OUTPATIENT
Start: 2024-08-07

## 2024-08-07 NOTE — PROGRESS NOTES
Subjective:   Adriana Vela is a 59 year old female who presents for ER F/U (Nausea and vomiting in adult)     Presents for ER f/u  No diarrhea since Sunday, last emesis episode was Monday morning. She is still feeling weak and tired. Noted the last two mornings she wakes up soaked in sweat. She does have a history of hot flashes and night sweats for years per , but patient states this is different. 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 Patient's  ate the same food the night she got sick, so food poisoning seems unlikely. She has been taking in mostly liquids - light gatorade, water, soup. Denies nausea with these foods. No dizziness, no light headedness.     Per RN triage-   Patient was in ER 8/4/24 with GI symptoms  These have resolved, she was feeling a bit better yesterday. Took advil prior to bed, patient has no fever. Woke up this morning sweating and sheets were wet from sweat.      Temp now at home 97.6, no meds this morning.   Eating BRAT diet and tolerating that. Last moved bowels yesterday, runny not frequent.   No nausea, vomiting or pain anywhere today.      Main complaint \"Just feeling weak and tired\". Able to get and walk around. No CP, SOB or dizziness noted. Patient wanting to know what else if anything she can be doing.   Yesterday drank 3-4 bottles of water and some Gatorade.      Advised need to push fluids, water mostly but some gatorade again today but don't over do it. Advised she continue brat diet (food reviewed with her to eat and limit) and scheduled for follow up tomorrow with NP at [facility]      Reviewed labs, WBC was slightly elevated along with a few other abnormal values - would you like to check anything prior to visit or any other recommendations?     History/Other:    Chief Complaint Reviewed and Verified  Nursing Notes Reviewed and   Verified  Tobacco Reviewed  Allergies Reviewed  Medications Reviewed    Problem List Reviewed   Medical History Reviewed  Surgical History   Reviewed  OB Status Reviewed  Family History Reviewed         Tobacco:  She smoked tobacco in the past but quit greater than 12 months ago.  Social History     Tobacco Use   Smoking Status Former    Current packs/day: 0.00    Average packs/day: 1 pack/day for 12.0 years (12.0 ttl pk-yrs)    Types: Cigarettes    Start date: 8/21/2001    Quit date: 8/21/2013    Years since quitting: 10.9   Smokeless Tobacco Never        Current Outpatient Medications   Medication Sig Dispense Refill    albuterol (PROAIR HFA) 108 (90 Base) MCG/ACT Inhalation Aero Soln Inhale 2 puffs into the lungs every 4 (four) hours as needed for Wheezing. 3 each 3    ondansetron 4 MG Oral Tablet Dispersible Take 1 tablet (4 mg total) by mouth every 4 (four) hours as needed for Nausea. 10 tablet 0    levothyroxine 100 MCG Oral Tab Take 1 tablet (100 mcg total) by mouth daily. 90 tablet 1    venlafaxine ER (EFFEXOR XR) 150 MG Oral Capsule SR 24 Hr Take 1 capsule (150 mg total) by mouth daily. 90 capsule 1    atorvastatin 20 MG Oral Tab Take 1 tablet (20 mg total) by mouth nightly. 90 tablet 3    nadolol 20 MG Oral Tab Take 1 tablet (20 mg total) by mouth daily. 90 tablet 3    doxepin 10 MG Oral Cap Take 1 capsule (10 mg total) by mouth nightly. 90 capsule 3    doxepin 10 MG Oral Cap Take 1 capsule (10 mg total) by mouth nightly. 90 capsule 0    amLODIPine 5 MG Oral Tab Take 1 tablet (5 mg total) by mouth daily. 90 tablet 3    citalopram 40 MG Oral Tab Take 1 tablet (40 mg total) by mouth daily. 90 tablet 0    valACYclovir 1 G Oral Tab Take 2 gram po every 12 hours x 1 day (Patient not taking: Reported on 8/4/2024) 4 tablet 3    ergocalciferol 1.25 MG (24369 UT) Oral Cap Take 1 capsule (50,000 Units total) by mouth once a week. (Patient not taking: Reported on 8/4/2024) 12 capsule 1    ergocalciferol 1.25 MG (76308 UT) Oral Cap Take 1 capsule (50,000 Units total) by mouth once a week. (Patient not  taking: Reported on 8/4/2024) 12 capsule 1     Review of Systems:  Review of Systems  10 point review of systems otherwise negative with the exception of HPI and assessment and plan    Objective:   /84   Pulse 58   Ht 5' 6\" (1.676 m)   Wt 141 lb (64 kg)   LMP 01/24/2016 (Approximate)   SpO2 98%   BMI 22.76 kg/m²  Estimated body mass index is 22.76 kg/m² as calculated from the following:    Height as of this encounter: 5' 6\" (1.676 m).    Weight as of this encounter: 141 lb (64 kg).  Physical Exam  Vitals reviewed.   Constitutional:       General: She is not in acute distress.     Appearance: Normal appearance. She is well-developed.   Cardiovascular:      Rate and Rhythm: Normal rate and regular rhythm.      Heart sounds: Normal heart sounds.   Pulmonary:      Effort: Pulmonary effort is normal.      Breath sounds: Examination of the right-upper field reveals wheezing. Examination of the left-upper field reveals wheezing. Examination of the right-middle field reveals wheezing. Examination of the left-middle field reveals wheezing. Wheezing present.   Skin:     General: Skin is warm and dry.   Neurological:      Mental Status: She is alert and oriented to person, place, and time.     Assessment & Plan:   1. Gastroenteritis (Primary)  -     Comp Metabolic Panel (14); Future; Expected date: 08/07/2024  2. Wheezing  -     SARS-COV-22-ALINITY; Future; Expected date: 08/07/2024  -     Albuterol Sulfate HFA; Inhale 2 puffs into the lungs every 4 (four) hours as needed for Wheezing.  Dispense: 3 each; Refill: 3  3. Fatigue, unspecified type  -     Comp Metabolic Panel (14); Future; Expected date: 08/07/2024  -     SARS-COV-22-ALINITY; Future; Expected date: 08/07/2024  Start to increase food intake- weakness may be due to low intake. As no more nausea, start re-introducing light solid foods   Noted to be wheezing on exam so will check for respiratory panel- does states she had a cough at the time of the other  symptoms     Wendy Bueno, APRN, 8/7/2024, 11:40 AM

## 2024-08-07 NOTE — PATIENT INSTRUCTIONS
Increase food intake - avoid fried or greasy foods, avoid spicy foods and dairy for now   Try chicken with rice and a vegetable, continue fluids and gator-lyte

## 2024-08-08 ENCOUNTER — TELEPHONE (OUTPATIENT)
Dept: INTERNAL MEDICINE CLINIC | Facility: CLINIC | Age: 59
End: 2024-08-08

## 2024-08-08 LAB
FLUAV + FLUBV RNA SPEC NAA+PROBE: NOT DETECTED
FLUAV + FLUBV RNA SPEC NAA+PROBE: NOT DETECTED
RSV RNA SPEC NAA+PROBE: NOT DETECTED
SARS-COV-2 RNA RESP QL NAA+PROBE: NOT DETECTED

## 2024-08-08 NOTE — TELEPHONE ENCOUNTER
Spoke to patient, she is feeling fatigued.  COVID test still pending.  She has not had bowel movement since last week, advised her to take milk of magnesia to help to move bowel, and start eating more normal high-fiber diet to normalize bowel movements.  She had negative colonoscopy in 2022.  Advised her to see infectious disease specialist for evaluation of positive QuantiFERON test that she has for some time.  Stay at home for right now until she feels better, pending COVID testing results.

## 2024-08-08 NOTE — TELEPHONE ENCOUNTER
Patient had ER visit 8/4/24 for nausea/emesis and diarrhea.  Patient had follow up with Wendy Bueno 8/7/24.    Patient is no longer nauseated and no diarrhea.  Patient is now concerned no bowel movement for a couple days.  The last two mornings patient has woke up sweating.  Patient is feeling weak and is supposed to work on Sunday and is concerned she will still feel weak then.  Per patient she is drinking plenty of water and eating chicken, rice, and soup.  Patient has refrained from eating bananas or drinking electrolyte drinks (see cmp results with elevated potassium).    Patient is asking if Dr Sabillon can review her office visit notes and advise what she can do about the weakness.  Per patient Dr Sabillon is her PCP and only saw Wendy since she could not get in with Dr Sabillon.    Please advise.

## 2024-08-15 ENCOUNTER — PATIENT MESSAGE (OUTPATIENT)
Dept: INTERNAL MEDICINE CLINIC | Facility: CLINIC | Age: 59
End: 2024-08-15

## 2024-08-19 ENCOUNTER — TELEMEDICINE (OUTPATIENT)
Dept: INTERNAL MEDICINE CLINIC | Facility: CLINIC | Age: 59
End: 2024-08-19
Payer: COMMERCIAL

## 2024-08-19 ENCOUNTER — TELEPHONE (OUTPATIENT)
Dept: INTERNAL MEDICINE CLINIC | Facility: CLINIC | Age: 59
End: 2024-08-19

## 2024-08-19 DIAGNOSIS — R06.2 WHEEZING: Primary | ICD-10-CM

## 2024-08-19 RX ORDER — AZITHROMYCIN 250 MG/1
TABLET, FILM COATED ORAL
Qty: 6 TABLET | Refills: 0 | Status: SHIPPED | OUTPATIENT
Start: 2024-08-19 | End: 2024-08-24

## 2024-08-19 RX ORDER — PREDNISONE 20 MG/1
20 TABLET ORAL 2 TIMES DAILY
Qty: 10 TABLET | Refills: 0 | Status: SHIPPED | OUTPATIENT
Start: 2024-08-19 | End: 2024-08-24

## 2024-08-19 NOTE — TELEPHONE ENCOUNTER
Pt called ,video visit made to discuss symptom          Last week I caught a loose cough in my chest I still feel a little weak. I’ve been using the inhaler, but the cough is still there. Is there anything that I can get to eliminate the cough please?

## 2024-08-20 NOTE — PROGRESS NOTES
Adriana Vela is a 59 year old female.    This visit is conducted using Telemedicine with live, interactive video and audio.    Patient has been referred to the [facility] website at [email or web address] to review the yearly Consent to Treat document.    Patient understands and accepts financial responsibility for any deductible, co-insurance and/or co-pays associated with this service.    HPI:   Pt reports hx of asthmatic bronchitis. She reports in the past she was given an albuterol inhaler and Zpak which helped. She reports one week ago she was sick with cough and had work up which was negative for covid/flu/RSV. She was given albuterol inhaler which is helping with the productive cough and wheezing but not resolving. Feels fatigued. Denies any CP, SOB, HA, fever, chills, palpitations, abdominal pain, runny nose, NVDC.   Current Outpatient Medications   Medication Sig Dispense Refill    azithromycin (ZITHROMAX Z-KAYLYN) 250 MG Oral Tab Take 2 tablets (500 mg total) by mouth daily for 1 day, THEN 1 tablet (250 mg total) daily for 4 days. 6 tablet 0    predniSONE 20 MG Oral Tab Take 1 tablet (20 mg total) by mouth 2 (two) times daily for 5 days. 10 tablet 0    albuterol (PROAIR HFA) 108 (90 Base) MCG/ACT Inhalation Aero Soln Inhale 2 puffs into the lungs every 4 (four) hours as needed for Wheezing. 3 each 3    levothyroxine 100 MCG Oral Tab Take 1 tablet (100 mcg total) by mouth daily. 90 tablet 1    venlafaxine ER (EFFEXOR XR) 150 MG Oral Capsule SR 24 Hr Take 1 capsule (150 mg total) by mouth daily. 90 capsule 1    atorvastatin 20 MG Oral Tab Take 1 tablet (20 mg total) by mouth nightly. 90 tablet 3    nadolol 20 MG Oral Tab Take 1 tablet (20 mg total) by mouth daily. 90 tablet 3    doxepin 10 MG Oral Cap Take 1 capsule (10 mg total) by mouth nightly. 90 capsule 3    doxepin 10 MG Oral Cap Take 1 capsule (10 mg total) by mouth nightly. 90 capsule 0    valACYclovir 1 G Oral Tab Take 2 gram po every 12 hours x 1 day  (Patient not taking: Reported on 2024) 4 tablet 3    amLODIPine 5 MG Oral Tab Take 1 tablet (5 mg total) by mouth daily. 90 tablet 3    ergocalciferol 1.25 MG (34778 UT) Oral Cap Take 1 capsule (50,000 Units total) by mouth once a week. (Patient not taking: Reported on 2024) 12 capsule 1    citalopram 40 MG Oral Tab Take 1 tablet (40 mg total) by mouth daily. 90 tablet 0    ergocalciferol 1.25 MG (12809 UT) Oral Cap Take 1 capsule (50,000 Units total) by mouth once a week. (Patient not taking: Reported on 2024) 12 capsule 1      Past Medical History:    Anxiety    Cancer (HCC)    Disorder of thyroid    Exposure to medical diagnostic radiation    at age 26    Thyroid cancer (HCC)    Thyroid disease    Visual impairment    glasses      Social History:  Social History     Socioeconomic History    Marital status:    Tobacco Use    Smoking status: Former     Current packs/day: 0.00     Average packs/day: 1 pack/day for 12.0 years (12.0 ttl pk-yrs)     Types: Cigarettes     Start date: 2001     Quit date: 2013     Years since quittin.0    Smokeless tobacco: Never   Vaping Use    Vaping status: Never Used   Substance and Sexual Activity    Alcohol use: No    Drug use: Yes     Types: Cannabis     Comment: occasional   Other Topics Concern    Caffeine Concern Yes     Comment: soda, coffee, 1 cup daily    Left Handed No    Right Handed Yes    Currently spends a great deal of time in the sun No    History of tanning No    Hx of Spending Great Deal of Time in Sun No    Bad sunburns in the past No    Tanning Salons in the Past No    Hx of Radiation Treatments Yes        REVIEW OF SYSTEMS:   Review of Systems   All other systems reviewed and are negative.         EXAM:   LMP 2016 (Approximate)     Physical Exam  Constitutional:       General: She is not in acute distress.  Pulmonary:      Effort: Pulmonary effort is normal. No respiratory distress.   Neurological:      Mental Status: She is  alert and oriented to person, place, and time.   Psychiatric:         Thought Content: Thought content normal.         Judgment: Judgment normal.            ASSESSMENT AND PLAN:   1. Wheezing  - CBC W Differential W Platelet [E]; Future  - XR CHEST PA + LAT CHEST (CPT=71046); Future  - azithromycin (ZITHROMAX Z-KAYLYN) 250 MG Oral Tab; Take 2 tablets (500 mg total) by mouth daily for 1 day, THEN 1 tablet (250 mg total) daily for 4 days.  Dispense: 6 tablet; Refill: 0  - predniSONE 20 MG Oral Tab; Take 1 tablet (20 mg total) by mouth 2 (two) times daily for 5 days.  Dispense: 10 tablet; Refill: 0  -Start Zpak  -Continue albuterol PRN, rinse mouth well  -Ok to continue mucinex.   -Nasal saline 2 sprays in each nostril every 3-4 hours as needed.   Tylenol 500 mg every 6-8 hours as needed (max dose 3000 mg/day).   Hydrate, humidifier, rest, honey/elderberry   -CBC and CXR ordered, advised to complete to r/o possible pneumonia.  The patient indicates understanding of these issues and agrees to the plan.  The patient is asked to return in 1 week (in person). .     The above note was creating using Dragon speech recognition technology. Please excuse any typos.

## 2024-10-21 RX ORDER — AMLODIPINE BESYLATE 5 MG/1
5 TABLET ORAL DAILY
Qty: 90 TABLET | Refills: 3 | Status: SHIPPED | OUTPATIENT
Start: 2024-10-21

## 2024-10-21 RX ORDER — VALACYCLOVIR HYDROCHLORIDE 1 G/1
TABLET, FILM COATED ORAL
Qty: 4 TABLET | Refills: 0 | Status: SHIPPED | OUTPATIENT
Start: 2024-10-21

## 2024-10-21 NOTE — TELEPHONE ENCOUNTER
Refill passed per [facility] protocol.  Requested Prescriptions   Pending Prescriptions Disp Refills    VALACYCLOVIR 1 G Oral Tab [Pharmacy Med Name: Valacyclovir Hydrochloride 1 Gm Tab Nort] 4 tablet 0     Sig: Take 2 gram by mouth every 12 hours x 1 day       Herpes Agent Protocol Passed - 10/21/2024  8:57 AM        Passed - In person appointment or virtual visit in the past 12 mos or appointment in next 3 mos     Recent Out[facility]              [facility]    [facility]    2 months [facility]    [facility]    5 months ago [facility]    [facility]    8 months ago [place]izz[facility] [place], CCC-A    Office Visit    8 months ago [place]izziness    [facility] [place]    Office Visit                        AMLODIPINE 5 MG Oral Tab [Pharmacy Med Name: Amlodipine Besylate 5 Mg Tab Unic] 90 tablet 0     Sig: Take 1 tablet (5 mg total) by mouth daily.       Hypertension Medications Protocol Passed - 10/21/2024  8:57 AM        Passed - CMP or BMP in past 12 months        Passed - Last BP reading less than 140/90     BP Readings from Last 1 Encounters:   08/07/24 128/84               Passed - In person appointment or virtual visit in the past 12 mos or appointment in next 3 mos     Recent Outpatient Visits              2 months ago [facility]    [facility]    2 months ago [facility]    Office Visit    5 months ago [facility]    Office  Visit    8 months ago Dizziness    [facility], Merly Arrington MS, CCC-A    Office Visit    8 months ago Dizziness    [facility], Ben Garrido MD    Office Visit                      Passed - EGFRCR or GFRNAA > 50     GFR Evaluation  EGFRCR: 70 , resulted on 8/7/2024             Recent Outpatient Visits              2 months ago Wheezing    [facility] [place] Zoila Ramirez APRN    Telemedicine    2 months ago Gastroenteritis    [facility], Wendy Mcbride APRN    Office Visit    5 months ago Anxiety    [facility] [place] Kandel, Ninel, MD    Office Visit    8 months ago Dizziness    [facility], Merly Arrington MS, CCC-A    Office Visit    8 months ago Dizziness    [facility], Ben Garrido MD    Office Visit

## 2024-11-29 ENCOUNTER — TELEPHONE (OUTPATIENT)
Dept: INTERNAL MEDICINE CLINIC | Facility: CLINIC | Age: 59
End: 2024-11-29

## 2024-11-29 NOTE — TELEPHONE ENCOUNTER
Patient is asking if Dr Sabillon knows of a good psychiatrist for a second opinion for her daughter. Daughter is going to [facility] currently and patient has not seen any changes in daughter for over a year.  Patient's daughter has hx of psychosis.   Daughter is not a patient of Dr Sabillon's. Patient trusts Dr Sabillon and \"that is why I am asking for her help\"

## 2024-12-03 ENCOUNTER — PATIENT MESSAGE (OUTPATIENT)
Dept: INTERNAL MEDICINE CLINIC | Facility: CLINIC | Age: 59
End: 2024-12-03

## 2024-12-03 NOTE — TELEPHONE ENCOUNTER
Patient called, verified Name and . She is following up regarding message below. States daughter is 32 years old and lives in [place]. Relayed we are still awaiting for provider's recommendation at this time but will follow up.     Dr. Sabillon please advise.

## 2024-12-04 ENCOUNTER — TELEPHONE (OUTPATIENT)
Dept: INTERNAL MEDICINE CLINIC | Facility: CLINIC | Age: 59
End: 2024-12-04

## 2024-12-04 RX ORDER — VENLAFAXINE HYDROCHLORIDE 150 MG/1
150 CAPSULE, EXTENDED RELEASE ORAL DAILY
Qty: 90 CAPSULE | Refills: 0 | Status: SHIPPED | OUTPATIENT
Start: 2024-12-04

## 2024-12-04 NOTE — TELEPHONE ENCOUNTER
Spoke to patient, advised her to add BuSpar 5 mg twice a day continue venlafaxine 150 mg once a day and come for follow-up visit

## 2024-12-04 NOTE — TELEPHONE ENCOUNTER
[employer] message sent .   RN =please call and assist     No future appointments.      COPIED AND PASTE [employer] 12/3/24;  Adriana Abarca Rn Triage (supporting Cherri Sabillon MD)16 hours ago (7:36 PM)       I need a refill on my venlafaxine  and I am so feeling anxious on the way home around 5 o’clock in the evening. Can we update the dose a little more? What do you think thank you

## 2024-12-04 NOTE — TELEPHONE ENCOUNTER
Refill passed per [facility] protocol.    Please see patients MyChart Message     Adriana Abarca Rn Triage (supporting Cherri Sabillon MD)16 hours ago (7:36 PM)       I need a refill on my venlafaxine  and I am so feeling anxious on the way home around 5 o’clock in the evening. Can we update the dose a little more? What do you think thank you         Requested Prescriptions   Pending Prescriptions Disp Refills    venlafaxine ER (EFFEXOR XR) 150 MG Oral Capsule SR 24 Hr 90 capsule 1     Sig: Take 1 capsule (150 mg total) by mouth daily.       Psychiatric Non-Scheduled (Anti-Anxiety) Passed - 12/4/2024 11:53 AM        Passed - In person appointment or virtual visit in the past 6 mos or appointment in next 3 mos     Recent Outpatient Visits              3 months ago Wheezing    [facility] [address], [place] Zoila Ramirez APRN    Telemedicine    3 months ago [facility]    Office Visit    6 months ago Anxiety    [facility] [address], [place] Cherri Sabillon MD    Office Visit    10 months ago Dizziness    [facility] Merly Kim MS, CCC-A    Office Visit    10 months ago [facility]    [facility]                      Passed - Depression Screening completed within the past 12 months           Recent Outpatient Visits              3 months ago Wheezing    [facility] [address], [place] Zoila Ramirez APRN    Telemedicine    3 months ago [facility]enter[facility]    [facility]    Office Visit    6 months ago Anxiety    [facility] [address], [place] Cherri Sabillon MD    Office Visit    10 months ago Dizziness    [facility]  Merly Kim, MS, CCC-A    Office Visit    10 months ago Dizziness    [facility] [place]    Office Visit

## 2024-12-04 NOTE — TELEPHONE ENCOUNTER
Patient states  told her she could call back if her anxiety wasn't getting better on current dose of venlafaxine. Patient states it is working, but by 5 pm every day she starts feeling anxious on the way home from work     Patient asking if she should take a different dose. Please advise.

## 2024-12-04 NOTE — TELEPHONE ENCOUNTER
Spoke to patient, advised her to call [facility] for resources for her daughter.

## 2024-12-04 NOTE — TELEPHONE ENCOUNTER
See condition update encounter 12/4/24 regarding the anxiety issue.     Medication pended. Routing for protocol.      No future appointments.

## 2024-12-05 NOTE — TELEPHONE ENCOUNTER
Talked to patient offered her an appointment but she says she will call back when she'll know her schedule.

## 2025-01-16 RX ORDER — VALACYCLOVIR HYDROCHLORIDE 1 G/1
2000 TABLET, FILM COATED ORAL EVERY 12 HOURS SCHEDULED
Qty: 12 TABLET | Refills: 5 | Status: SHIPPED | OUTPATIENT
Start: 2025-01-16

## 2025-01-16 NOTE — TELEPHONE ENCOUNTER
Refill passed per [facility] protocols.    Requested Prescriptions   Pending Prescriptions Disp Refills    VALACYCLOVIR 1 G Oral Tab [Pharmacy Med Name: Valacyclovir Hydrochloride 1 Gm Tab Nort] 12 tablet 5     Sig: Take 2 gram by mouth every 12 hours x 1 day       Herpes Agent Protocol Passed - 1/16/2025 11:05 AM        Passed - In person appointment or virtual visit in the past 12 mos or appointment in next 3 mos     Recent Outpatient Visits              5 months ago Wheezing    [facility], [address], [place] Zoila Ramirez APRN    Telemedicine    5 months ago Gastroenteritis    [facility] [place] [facility] [facility]    Office Visit    8 months ago Anxiety    [facility] [address], [place] Cherri Sabillon MD    Office Visit    11 months ago [facility]    Office Visit    11 months ago [facility]izz[facility]    [facility] Visit          Future Appointments         Provider Department Appt Notes    In 2 weeks Cherri Sabillon MD [facility], [address], [place] Current medication’s and blood work  last px 2022                    Passed - Medication is active on med list

## 2025-01-31 ENCOUNTER — LAB ENCOUNTER (OUTPATIENT)
Dept: LAB | Age: 60
End: 2025-01-31
Attending: INTERNAL MEDICINE
Payer: COMMERCIAL

## 2025-01-31 ENCOUNTER — OFFICE VISIT (OUTPATIENT)
Dept: INTERNAL MEDICINE CLINIC | Facility: CLINIC | Age: 60
End: 2025-01-31
Payer: COMMERCIAL

## 2025-01-31 VITALS
SYSTOLIC BLOOD PRESSURE: 136 MMHG | HEART RATE: 81 BPM | BODY MASS INDEX: 22.9 KG/M2 | DIASTOLIC BLOOD PRESSURE: 83 MMHG | WEIGHT: 142.5 LBS | HEIGHT: 66 IN

## 2025-01-31 DIAGNOSIS — Z00.00 ANNUAL PHYSICAL EXAM: ICD-10-CM

## 2025-01-31 DIAGNOSIS — R06.2 WHEEZING: ICD-10-CM

## 2025-01-31 DIAGNOSIS — R76.12 POSITIVE QUANTIFERON-TB GOLD TEST: ICD-10-CM

## 2025-01-31 DIAGNOSIS — Z01.419 ENCOUNTER FOR GYNECOLOGICAL EXAMINATION WITH PAPANICOLAOU SMEAR OF CERVIX: ICD-10-CM

## 2025-01-31 DIAGNOSIS — Z00.00 ANNUAL PHYSICAL EXAM: Primary | ICD-10-CM

## 2025-01-31 DIAGNOSIS — E55.9 VITAMIN D DEFICIENCY: ICD-10-CM

## 2025-01-31 DIAGNOSIS — F33.41 RECURRENT MAJOR DEPRESSIVE DISORDER, IN PARTIAL REMISSION: ICD-10-CM

## 2025-01-31 DIAGNOSIS — F41.9 ANXIETY: ICD-10-CM

## 2025-01-31 LAB
ALBUMIN SERPL-MCNC: 5.1 G/DL (ref 3.2–4.8)
ALBUMIN/GLOB SERPL: 2 {RATIO} (ref 1–2)
ALP LIVER SERPL-CCNC: 86 U/L
ALT SERPL-CCNC: 24 U/L
ANION GAP SERPL CALC-SCNC: 7 MMOL/L (ref 0–18)
AST SERPL-CCNC: 30 U/L (ref ?–34)
BASOPHILS # BLD AUTO: 0.05 X10(3) UL (ref 0–0.2)
BASOPHILS NFR BLD AUTO: 0.9 %
BILIRUB SERPL-MCNC: 0.3 MG/DL (ref 0.2–1.1)
BUN BLD-MCNC: 9 MG/DL (ref 9–23)
BUN/CREAT SERPL: 8.4 (ref 10–20)
CALCIUM BLD-MCNC: 9.4 MG/DL (ref 8.7–10.4)
CHLORIDE SERPL-SCNC: 102 MMOL/L (ref 98–112)
CHOLEST SERPL-MCNC: 174 MG/DL (ref ?–200)
CO2 SERPL-SCNC: 33 MMOL/L (ref 21–32)
CREAT BLD-MCNC: 1.07 MG/DL
DEPRECATED RDW RBC AUTO: 41.1 FL (ref 35.1–46.3)
EGFRCR SERPLBLD CKD-EPI 2021: 59 ML/MIN/1.73M2 (ref 60–?)
EOSINOPHIL # BLD AUTO: 0.21 X10(3) UL (ref 0–0.7)
EOSINOPHIL NFR BLD AUTO: 4 %
ERYTHROCYTE [DISTWIDTH] IN BLOOD BY AUTOMATED COUNT: 13.2 % (ref 11–15)
FASTING PATIENT LIPID ANSWER: NO
FASTING STATUS PATIENT QL REPORTED: NO
GLOBULIN PLAS-MCNC: 2.6 G/DL (ref 2–3.5)
GLUCOSE BLD-MCNC: 101 MG/DL (ref 70–99)
HCT VFR BLD AUTO: 37 %
HDLC SERPL-MCNC: 40 MG/DL (ref 40–59)
HGB BLD-MCNC: 11.8 G/DL
IMM GRANULOCYTES # BLD AUTO: 0.02 X10(3) UL (ref 0–1)
IMM GRANULOCYTES NFR BLD: 0.4 %
LDLC SERPL CALC-MCNC: 108 MG/DL (ref ?–100)
LYMPHOCYTES # BLD AUTO: 1.42 X10(3) UL (ref 1–4)
LYMPHOCYTES NFR BLD AUTO: 26.9 %
MCH RBC QN AUTO: 27.3 PG (ref 26–34)
MCHC RBC AUTO-ENTMCNC: 31.9 G/DL (ref 31–37)
MCV RBC AUTO: 85.5 FL
MONOCYTES # BLD AUTO: 0.56 X10(3) UL (ref 0.1–1)
MONOCYTES NFR BLD AUTO: 10.6 %
NEUTROPHILS # BLD AUTO: 3.02 X10 (3) UL (ref 1.5–7.7)
NEUTROPHILS # BLD AUTO: 3.02 X10(3) UL (ref 1.5–7.7)
NEUTROPHILS NFR BLD AUTO: 57.2 %
NONHDLC SERPL-MCNC: 134 MG/DL (ref ?–130)
OSMOLALITY SERPL CALC.SUM OF ELEC: 293 MOSM/KG (ref 275–295)
PLATELET # BLD AUTO: 228 10(3)UL (ref 150–450)
POTASSIUM SERPL-SCNC: 3.9 MMOL/L (ref 3.5–5.1)
PROT SERPL-MCNC: 7.7 G/DL (ref 5.7–8.2)
RBC # BLD AUTO: 4.33 X10(6)UL
SODIUM SERPL-SCNC: 142 MMOL/L (ref 136–145)
TRIGL SERPL-MCNC: 147 MG/DL (ref 30–149)
TSI SER-ACNC: 15.38 UIU/ML (ref 0.55–4.78)
VIT D+METAB SERPL-MCNC: 23.9 NG/ML (ref 30–100)
VLDLC SERPL CALC-MCNC: 25 MG/DL (ref 0–30)
WBC # BLD AUTO: 5.3 X10(3) UL (ref 4–11)

## 2025-01-31 PROCEDURE — 80053 COMPREHEN METABOLIC PANEL: CPT

## 2025-01-31 PROCEDURE — 80061 LIPID PANEL: CPT

## 2025-01-31 PROCEDURE — 86480 TB TEST CELL IMMUN MEASURE: CPT

## 2025-01-31 PROCEDURE — 85025 COMPLETE CBC W/AUTO DIFF WBC: CPT

## 2025-01-31 PROCEDURE — 82306 VITAMIN D 25 HYDROXY: CPT

## 2025-01-31 PROCEDURE — 36415 COLL VENOUS BLD VENIPUNCTURE: CPT

## 2025-01-31 PROCEDURE — 84443 ASSAY THYROID STIM HORMONE: CPT

## 2025-01-31 RX ORDER — AZITHROMYCIN 250 MG/1
TABLET, FILM COATED ORAL
Qty: 6 TABLET | Refills: 0 | Status: SHIPPED | OUTPATIENT
Start: 2025-01-31 | End: 2025-02-05

## 2025-01-31 RX ORDER — BUSPIRONE HYDROCHLORIDE 5 MG/1
5 TABLET ORAL 3 TIMES DAILY
Qty: 270 TABLET | Refills: 1 | Status: SHIPPED | OUTPATIENT
Start: 2025-01-31

## 2025-02-03 ENCOUNTER — TELEPHONE (OUTPATIENT)
Dept: INTERNAL MEDICINE CLINIC | Facility: CLINIC | Age: 60
End: 2025-02-03

## 2025-02-03 LAB
M TB IFN-G CD4+ T-CELLS BLD-ACNC: 0.06 IU/ML
M TB TUBERC IFN-G BLD QL: POSITIVE
M TB TUBERC IGNF/MITOGEN IGNF CONTROL: >10 IU/ML
QFT TB1 AG MINUS NIL: 0.41 IU/ML
QFT TB2 AG MINUS NIL: 0.44 IU/ML

## 2025-02-08 NOTE — PROGRESS NOTES
Subjective:     Patient ID: Adriana Vela is a 59 year old female.  Presents for physical exam and follow-up another chronic condition    HPI  Patient reports that overall she has been doing fair, still feels that depression is not under control on current medications.  But declines seeing the past, not opposed to see counselor.  Continues to experience anxiety almost daily when she comes home from work and afternoon, has been taking buspirone 5 mg twice a day for couple months additionally to Effexor.  Continues to have absent appetite, but maintaining weight.  Bothered by hot flashes.  She is seen in the presence of her   History of positive QuantiFERON test, was seen by pulmonologist who referred her to see infectious disease specialist which she never did  Review of Systems       Constitutional:  Negative for decreased activity, fever, irritability and lethargy  Cardiovascular:  Negative for chest pain and irregular heartbeat/palpitations  Respiratory:  Negative for cough, dyspnea and wheezing.  Eyes:  Negative for eye discharge and vision loss  Endocrine:  Negative for polydipsia and polyphagia  Integumentary:  Negative for pruritus and rash  Neurological:  Negative for gait disturbance, paresthesias.     Current Outpatient Medications   Medication Sig Dispense Refill    busPIRone 5 MG Oral Tab Take 1 tablet (5 mg total) by mouth 3 (three) times daily. 270 tablet 1    valACYclovir 1 G Oral Tab Take 2 tablets (2,000 mg total) by mouth every 12 (twelve) hours. Take for 1 day. As needed for outbreak. 12 tablet 5    venlafaxine ER (EFFEXOR XR) 150 MG Oral Capsule SR 24 Hr Take 1 capsule (150 mg total) by mouth daily. 90 capsule 0    amLODIPine 5 MG Oral Tab Take 1 tablet (5 mg total) by mouth daily. 90 tablet 3    albuterol (PROAIR HFA) 108 (90 Base) MCG/ACT Inhalation Aero Soln Inhale 2 puffs into the lungs every 4 (four) hours as needed for Wheezing. 3 each 3    levothyroxine 100 MCG Oral Tab Take 1 tablet  (100 mcg total) by mouth daily. 90 tablet 1    atorvastatin 20 MG Oral Tab Take 1 tablet (20 mg total) by mouth nightly. 90 tablet 3    nadolol 20 MG Oral Tab Take 1 tablet (20 mg total) by mouth daily. 90 tablet 3    doxepin 10 MG Oral Cap Take 1 capsule (10 mg total) by mouth nightly. 90 capsule 3    doxepin 10 MG Oral Cap Take 1 capsule (10 mg total) by mouth nightly. 90 capsule 0    ergocalciferol 1.25 MG (28062 UT) Oral Cap Take 1 capsule (50,000 Units total) by mouth once a week. (Patient not taking: Reported on 2025) 12 capsule 1     Allergies:Allergies[1]    Past Medical History:    Anxiety    Cancer (HCC)    Disorder of thyroid    Exposure to medical diagnostic radiation    at age 26    Thyroid cancer (HCC)    Thyroid disease    Visual impairment    glasses      Past Surgical History:   Procedure Laterality Date    Colonoscopy N/A 2022    Procedure: COLONOSCOPY WITH COLD SNARE POLYPECTOMY;  Surgeon: Archie Braxton MD;  Location:  ENDOSCOPY    Thyroid ablation, carcinoma      Tonsillectomy      Treat ectopic preg,rmv tube/ovary        Family History   Problem Relation Age of Onset    Lipids Brother 57    Heart Disease Father 65        (cause of death)    Heart Disease Mother 74        (cause of death)    Lipids Brother 50      Social History:   Social History     Socioeconomic History    Marital status:    Tobacco Use    Smoking status: Former     Current packs/day: 0.00     Average packs/day: 1 pack/day for 12.0 years (12.0 ttl pk-yrs)     Types: Cigarettes     Start date: 2001     Quit date: 2013     Years since quittin.4     Passive exposure: Past    Smokeless tobacco: Never   Vaping Use    Vaping status: Never Used   Substance and Sexual Activity    Alcohol use: No    Drug use: Yes     Types: Cannabis     Comment: occasional   Other Topics Concern    Caffeine Concern Yes     Comment: soda, coffee, 1 cup daily    Left Handed No    Right Handed Yes    Currently spends a  great deal of time in the sun No    History of tanning No    Hx of Spending Great Deal of Time in Sun No    Bad sunburns in the past No    Tanning Salons in the Past No    Hx of Radiation Treatments Yes        /83 (BP Location: Left arm, Patient Position: Sitting, Cuff Size: adult)   Pulse 81   Ht 5' 6\" (1.676 m)   Wt 142 lb 8 oz (64.6 kg)   LMP 01/24/2016 (Approximate)   BMI 23.00 kg/m²    Physical Exam  Constitutional:       Appearance: Normal appearance.   HENT:      Head: Normocephalic and atraumatic.      Right Ear: Tympanic membrane, ear canal and external ear normal.      Left Ear: Tympanic membrane, ear canal and external ear normal.      Mouth/Throat:      Mouth: Mucous membranes are dry.      Pharynx: No oropharyngeal exudate or posterior oropharyngeal erythema.   Eyes:      General: No scleral icterus.     Extraocular Movements: Extraocular movements intact.      Conjunctiva/sclera: Conjunctivae normal.      Pupils: Pupils are equal, round, and reactive to light.   Neck:      Vascular: No carotid bruit.   Cardiovascular:      Rate and Rhythm: Normal rate and regular rhythm.      Heart sounds: No murmur heard.     No gallop.   Pulmonary:      Effort: Pulmonary effort is normal. No respiratory distress.   Chest:   Breasts:     Right: Normal. No mass or skin change.      Left: Normal. No mass or skin change.   Abdominal:      General: Bowel sounds are normal.      Palpations: Abdomen is soft.   Musculoskeletal:         General: Normal range of motion.      Cervical back: Normal range of motion and neck supple.      Right lower leg: No edema.      Left lower leg: No edema.   Lymphadenopathy:      Cervical: No cervical adenopathy.      Upper Body:      Right upper body: No supraclavicular or axillary adenopathy.      Left upper body: No axillary adenopathy.   Skin:     General: Skin is warm.      Coloration: Skin is not jaundiced.   Neurological:      General: No focal deficit present.      Mental  Status: She is alert and oriented to person, place, and time. Mental status is at baseline.      Gait: Gait normal.   Psychiatric:         Mood and Affect: Mood normal.         Behavior: Behavior normal.         Thought Content: Thought content normal.         Assessment & Plan:   1. Annual physical exam patient will continue healthy diet, encourage regular physical activities will check labs   2. Vitamin D deficiency check vitamin D level treat if necessary   3. Positive QuantiFERON-TB Gold test provided referral for infectious disease specialist   4. Anxiety increase buspirone take 5 mg 3 times a day or 5 mg in the morning and 10 in afternoon couple hours prior to going home   5. Recurrent major depressive disorder, in partial remission continue Effexor  mg daily , patient to seek counseling, referral provided to [facility] behavioral services   6. Encounter for gynecological examination with Papanicolaou smear of cervix referred patient to see gynecology       Orders Placed This Encounter   Procedures    CBC With Differential With Platelet    Comp Metabolic Panel (14)    Lipid Panel    Assay, Thyroid Stim Hormone    Vitamin D [E]    Quantiferon TB Gold (in Tube)       Meds This Visit:  Requested Prescriptions     Signed Prescriptions Disp Refills    azithromycin (ZITHROMAX Z-KAYLYN) 250 MG Oral Tab 6 tablet 0     Sig: Take 2 tablets (500 mg total) by mouth daily for 1 day, THEN 1 tablet (250 mg total) daily for 4 days.    busPIRone 5 MG Oral Tab 270 tablet 1     Sig: Take 1 tablet (5 mg total) by mouth 3 (three) times daily.     Follow-up in 3 months  Imaging & Referrals:  INFECTIOUS DISEASE - INTERNAL  OBG - INTERNAL  OP REFERRAL TO [facility]            [1] No Known Allergies

## 2025-02-12 RX ORDER — ERGOCALCIFEROL 1.25 MG/1
50000 CAPSULE, LIQUID FILLED ORAL WEEKLY
Qty: 12 CAPSULE | Refills: 0 | Status: SHIPPED | OUTPATIENT
Start: 2025-02-12

## 2025-02-12 NOTE — TELEPHONE ENCOUNTER
Please review. Protocol Failed; No Protocol    Medication(s) to Refill:   Requested Prescriptions     Pending Prescriptions Disp Refills    ERGOCALCIFEROL 1.25 MG (53941 UT) Oral Cap [Pharmacy Med Name: Vitamin D 50,000 Unit Cap Bion] 12 capsule 0     Sig: Take 1 capsule (50,000 Units total) by mouth once a week.         Reason for Medication Refill being sent to Provider / Reason Protocol Failed:  [x] Non-Protocol Medication        Recent Labs:  Lab Results   Component Value Date    VITD 23.9 (L) 01/31/2025    TVIT21ZZ 26.7 08/14/2011              Requested Prescriptions   Pending Prescriptions Disp Refills    ERGOCALCIFEROL 1.25 MG (08202 UT) Oral Cap [Pharmacy Med Name: Vitamin D 50,000 Unit Cap Bion] 12 capsule 0     Sig: Take 1 capsule (50,000 Units total) by mouth once a week.       There is no refill protocol information for this order              Recent Outpatient Visits              1 week ago Annual physical exam    [facility], [address], [place] Cherri Sabillon MD    Office Visit    5 months ago Wheezing    [facility] [address], [place] Zoila Ramirez APRN    Telemedicine    6 months ago Gastroenteritis    [facility], Wendy Mcbride APRN    Office Visit    9 months ago [facility]    [facility] Visit    1 year ago Dizziness    [facility], Merly Arrington MS, CCC-A    Office Visit

## 2025-02-20 RX ORDER — DOXEPIN HYDROCHLORIDE 10 MG/1
10 CAPSULE ORAL NIGHTLY
Qty: 90 CAPSULE | Refills: 0 | Status: SHIPPED | OUTPATIENT
Start: 2025-02-20

## 2025-03-06 RX ORDER — VENLAFAXINE HYDROCHLORIDE 150 MG/1
150 CAPSULE, EXTENDED RELEASE ORAL DAILY
Qty: 90 CAPSULE | Refills: 0 | Status: SHIPPED | OUTPATIENT
Start: 2025-03-06

## 2025-03-06 NOTE — TELEPHONE ENCOUNTER
Please review refill failed/no protocol - interaction - for Dr. Sabillon (out of office)    High  Drug-Drug: doxepin and venlafaxine ERAdditive serotonergic effects may occur during coadministration of serotonin/norepinephrine reuptake inhibitors (SNRIs) and tricyclic antidepressants (TCAs), and the risk of developing serotonin syndrome may be increased.  Details    Requested Prescriptions     Pending Prescriptions Disp Refills    VENLAFAXINE  MG Oral Capsule SR 24 Hr [Pharmacy Med Name: Venlafaxine Hydrochloride Er 24hr 150 Mg Cap Gran] 90 capsule 0     Sig: Take 1 capsule (150 mg total) by mouth daily.         Recent Visits  Date Type Provider Dept   01/31/25 Office Visit Cherri Sabillon MD [facility]   05/10/24 Office Visit Cherri Sabillon MD [facility]   01/27/24 [facility]   01[facility]24 Office Visit Cherri Sabillon MD [facility]   Showing recent visits within past 540 days with a meds authorizing provider and meeting all other requirements  Future Appointments  No visits were found meeting these conditions.  Showing future appointments within next 150 days with a meds authorizing provider and meeting all other requirements    Requested Prescriptions   Pending Prescriptions Disp Refills    VENLAFAXINE  MG Oral Capsule SR 24 Hr [Pharmacy Med Name: Venlafaxine Hydrochloride Er 24hr 150 Mg Cap Gran] 90 capsule 0     Sig: Take 1 capsule (150 mg total) by mouth daily.       Psychiatric Non-Scheduled (Anti-Anxiety) Passed - 3/6/2025  5:53 AM        Passed - In person appointment or virtual visit in the past 6 mos or appointment in next 3 mos     Recent Outpatient Visits              1 month ago Annual physical exam    [facility], [address], [place] Cherri Sabillon MD    Office Visit    6 months ago [facility]    [facility]    7 months ago  Gastroenteritis    [facility], Wendy Mcbride APRN    Office Visit    10 months ago Anxiety    [facility] [place] Kandel, N[place], MD    Office Visit    1 year ago Dizziness    [facility], Merly Arrington, MS, CCC-A    Office Visit                      Passed - Depression Screening completed within the past 12 months        Passed - Medication is active on med list

## 2025-03-06 NOTE — TELEPHONE ENCOUNTER
Refill passed per [facility] protocol.  Requested Prescriptions   Pending Prescriptions Disp Refills    VENLAFAXINE  MG Oral Capsule SR 24 Hr [Pharmacy Med Name: Venlafaxine Hydrochloride Er 24hr 150 Mg Cap Gran] 90 capsule 0     Sig: Take 1 capsule (150 mg total) by mouth daily.       Psychiatric Non-Scheduled (Anti-Anxiety) Passed - 3/6/2025  5:52 AM        Passed - In person appointment or virtual visit in the past 6 mos or appointment in next 3 mos     Recent Outpatient Visits              1 month [facility]    Office Visit    6 months ago Wheezing    [facility] [address], [place] Zoila Ramirez APRN    Telemedicine    7 months ago [facility]    Office Visit    10 months ago Anxiety    [facility]    Office Visit    1 year ago [facility]izziness    [facility]    Office Visit                      Passed - Depression Screening completed within the past 12 months        Passed - Medication is active on med list

## 2025-04-06 NOTE — TELEPHONE ENCOUNTER
Action Requested: Summary for Provider     []  Critical Lab, Recommendations Needed  [] Need Additional Advice  []   FYI    []   Need Orders  [] Need Medications Sent to Pharmacy  []  Other     SUMMARY: SEE [employer] MESSAGE BELOW.  advised home care, but pa
I  Send  rx for  z-pack to pharmacy on file , if she is not better need to bee evaluated
Patient advised and already picked up the inhaler.
Spoke with the patient and informed her Dr. Denita Lazaro has ordered the azithromycin. Patient was made aware of Dr. Ashly Hamlin orders. Patient voiced understanding and is requesting Dr. Denita Lazaro to refill her inhaler.  Patient reports she is congested and has a tigh
This nurse received order from Dr. Ulises Wu to refill the inhaler for the inhaler x 3. This nurse attempted to refill the inhaler and received a high alert message. Routed to provider to approve.
rx  For albuterl inhaler faxed
37.2
24-Feb-2022 14:06

## 2025-04-16 ENCOUNTER — ORDER TRANSCRIPTION (OUTPATIENT)
Dept: ADMINISTRATIVE | Facility: HOSPITAL | Age: 60
End: 2025-04-16

## 2025-04-16 DIAGNOSIS — Z12.31 SCREENING MAMMOGRAM, ENCOUNTER FOR: Primary | ICD-10-CM

## 2025-04-22 RX ORDER — LEVOTHYROXINE SODIUM 100 UG/1
100 TABLET ORAL DAILY
Qty: 90 TABLET | Refills: 1 | OUTPATIENT
Start: 2025-04-22

## 2025-04-22 RX ORDER — LEVOTHYROXINE SODIUM 100 UG/1
100 TABLET ORAL DAILY
Qty: 90 TABLET | Refills: 3 | Status: SHIPPED | OUTPATIENT
Start: 2025-04-22

## 2025-05-16 ENCOUNTER — HOSPITAL ENCOUNTER (OUTPATIENT)
Dept: MAMMOGRAPHY | Age: 60
Discharge: HOME | End: 2025-05-16
Attending: INTERNAL MEDICINE
Payer: COMMERCIAL

## 2025-05-16 DIAGNOSIS — Z12.31 SCREENING MAMMOGRAM, ENCOUNTER FOR: ICD-10-CM

## 2025-05-16 PROCEDURE — 77067 SCR MAMMO BI INCL CAD: CPT | Performed by: INTERNAL MEDICINE

## 2025-05-16 PROCEDURE — 77063 BREAST TOMOSYNTHESIS BI: CPT | Performed by: INTERNAL MEDICINE

## 2025-05-16 RX ORDER — DOXEPIN HYDROCHLORIDE 10 MG/1
10 CAPSULE ORAL NIGHTLY
Qty: 90 CAPSULE | Refills: 0 | OUTPATIENT
Start: 2025-05-16

## 2025-05-16 RX ORDER — DOXEPIN HYDROCHLORIDE 10 MG/1
10 CAPSULE ORAL NIGHTLY
Qty: 90 CAPSULE | Refills: 0 | Status: SHIPPED | OUTPATIENT
Start: 2025-05-16

## 2025-05-16 RX ORDER — NADOLOL 20 MG/1
20 TABLET ORAL DAILY
Qty: 90 TABLET | Refills: 3 | Status: SHIPPED | OUTPATIENT
Start: 2025-05-16

## 2025-05-16 RX ORDER — NADOLOL 20 MG/1
20 TABLET ORAL DAILY
Qty: 90 TABLET | Refills: 0 | OUTPATIENT
Start: 2025-05-16

## 2025-05-16 NOTE — TELEPHONE ENCOUNTER
Please review. Protocol Failed; No Protocol     Please review pended refill request as unable to refill due to high/very high drug interaction warning copied here;            High  Drug-Drug: albuterol and nadololPharmacologic effects of beta-2 agonists (eg, Beta-2 Agonists) may be decreased by beta-adrenergic blockers (eg, Beta Blockers). Untoward physiologic effects, characterized by bronchospasm, may occur.  Details

## 2025-06-02 RX ORDER — VENLAFAXINE HYDROCHLORIDE 150 MG/1
150 CAPSULE, EXTENDED RELEASE ORAL DAILY
Qty: 90 CAPSULE | Refills: 0 | Status: SHIPPED | OUTPATIENT
Start: 2025-06-02

## 2025-06-02 NOTE — TELEPHONE ENCOUNTER
Refill passed [facility] protocol.     Please review due to High Drug-Drug: doxepin and venlafaxine ER Additive serotonergic effects may occur during coadministration of serotonin/norepinephrine reuptake inhibitors (SNRIs) and tricyclic antidepressants (TCAs), and the risk of developing serotonin syndrome may be increased.

## 2025-06-03 RX ORDER — ATORVASTATIN CALCIUM 20 MG/1
20 TABLET, FILM COATED ORAL NIGHTLY
Qty: 90 TABLET | Refills: 3 | Status: SHIPPED | OUTPATIENT
Start: 2025-06-03

## 2025-06-05 ENCOUNTER — TELEPHONE (OUTPATIENT)
Dept: INTERNAL MEDICINE CLINIC | Facility: CLINIC | Age: 60
End: 2025-06-05

## 2025-06-06 RX ORDER — VENLAFAXINE HYDROCHLORIDE 150 MG/1
150 CAPSULE, EXTENDED RELEASE ORAL DAILY
Qty: 90 CAPSULE | Refills: 0 | OUTPATIENT
Start: 2025-06-06

## 2025-06-06 RX ORDER — BUSPIRONE HYDROCHLORIDE 5 MG/1
5 TABLET ORAL 3 TIMES DAILY
Qty: 270 TABLET | Refills: 1 | OUTPATIENT
Start: 2025-06-06

## 2025-06-06 NOTE — TELEPHONE ENCOUNTER
Patient is due for a:   [] Annual physical exam   [x] Medication follow up office visit   [] Diabetic/blood pressure routine follow up office visit   [] Establish Care with new primary care provider    [employer] message sent to patient. Please also make phone call attempt and assist patient with scheduling.    Last office visit: 1/31/25    **Please attempt all available phone numbers in patient's chart. This includes alternative numbers and contacts on patient's release of information. Thank you**

## 2025-06-27 ENCOUNTER — LAB ENCOUNTER (OUTPATIENT)
Dept: LAB | Age: 60
End: 2025-06-27
Attending: INTERNAL MEDICINE
Payer: COMMERCIAL

## 2025-06-27 DIAGNOSIS — D64.89 ANEMIA DUE TO OTHER CAUSE, NOT CLASSIFIED: ICD-10-CM

## 2025-06-27 DIAGNOSIS — E89.0 POSTOPERATIVE HYPOTHYROIDISM: ICD-10-CM

## 2025-06-27 LAB
ANION GAP SERPL CALC-SCNC: 6 MMOL/L (ref 0–18)
BASOPHILS # BLD AUTO: 0.08 X10(3) UL (ref 0–0.2)
BASOPHILS NFR BLD AUTO: 1.5 %
BUN BLD-MCNC: 9 MG/DL (ref 9–23)
BUN/CREAT SERPL: 10.7 (ref 10–20)
CALCIUM BLD-MCNC: 9.2 MG/DL (ref 8.7–10.4)
CHLORIDE SERPL-SCNC: 105 MMOL/L (ref 98–112)
CO2 SERPL-SCNC: 31 MMOL/L (ref 21–32)
CREAT BLD-MCNC: 0.84 MG/DL (ref 0.55–1.02)
DEPRECATED HBV CORE AB SER IA-ACNC: 99 NG/ML (ref 50–306)
DEPRECATED RDW RBC AUTO: 37.7 FL (ref 35.1–46.3)
EGFRCR SERPLBLD CKD-EPI 2021: 79 ML/MIN/1.73M2 (ref 60–?)
EOSINOPHIL # BLD AUTO: 0.27 X10(3) UL (ref 0–0.7)
EOSINOPHIL NFR BLD AUTO: 4.9 %
ERYTHROCYTE [DISTWIDTH] IN BLOOD BY AUTOMATED COUNT: 12.6 % (ref 11–15)
FASTING STATUS PATIENT QL REPORTED: YES
FOLATE SERPL-MCNC: 14.2 NG/ML (ref 5.4–?)
GLUCOSE BLD-MCNC: 120 MG/DL (ref 70–99)
HCT VFR BLD AUTO: 38.4 % (ref 35–48)
HGB BLD-MCNC: 11.9 G/DL (ref 12–16)
IMM GRANULOCYTES # BLD AUTO: 0.01 X10(3) UL (ref 0–1)
IMM GRANULOCYTES NFR BLD: 0.2 %
IRON SATN MFR SERPL: 17 % (ref 15–50)
IRON SERPL-MCNC: 56 UG/DL (ref 50–170)
LYMPHOCYTES # BLD AUTO: 1.59 X10(3) UL (ref 1–4)
LYMPHOCYTES NFR BLD AUTO: 28.9 %
MCH RBC QN AUTO: 25.4 PG (ref 26–34)
MCHC RBC AUTO-ENTMCNC: 31 G/DL (ref 31–37)
MCV RBC AUTO: 82.1 FL (ref 80–100)
MONOCYTES # BLD AUTO: 0.49 X10(3) UL (ref 0.1–1)
MONOCYTES NFR BLD AUTO: 8.9 %
NEUTROPHILS # BLD AUTO: 3.07 X10 (3) UL (ref 1.5–7.7)
NEUTROPHILS # BLD AUTO: 3.07 X10(3) UL (ref 1.5–7.7)
NEUTROPHILS NFR BLD AUTO: 55.6 %
OSMOLALITY SERPL CALC.SUM OF ELEC: 294 MOSM/KG (ref 275–295)
PLATELET # BLD AUTO: 258 10(3)UL (ref 150–450)
POTASSIUM SERPL-SCNC: 5.2 MMOL/L (ref 3.5–5.1)
RBC # BLD AUTO: 4.68 X10(6)UL (ref 3.8–5.3)
SODIUM SERPL-SCNC: 142 MMOL/L (ref 136–145)
TOTAL IRON BINDING CAPACITY: 321 UG/DL (ref 250–425)
TRANSFERRIN SERPL-MCNC: 261 MG/DL (ref 250–380)
TSI SER-ACNC: 0.26 UIU/ML (ref 0.55–4.78)
VIT B12 SERPL-MCNC: 372 PG/ML (ref 211–911)
WBC # BLD AUTO: 5.5 X10(3) UL (ref 4–11)

## 2025-06-27 PROCEDURE — 82746 ASSAY OF FOLIC ACID SERUM: CPT

## 2025-06-27 PROCEDURE — 83540 ASSAY OF IRON: CPT

## 2025-06-27 PROCEDURE — 84443 ASSAY THYROID STIM HORMONE: CPT

## 2025-06-27 PROCEDURE — 82728 ASSAY OF FERRITIN: CPT

## 2025-06-27 PROCEDURE — 36415 COLL VENOUS BLD VENIPUNCTURE: CPT

## 2025-06-27 PROCEDURE — 84466 ASSAY OF TRANSFERRIN: CPT

## 2025-06-27 PROCEDURE — 85025 COMPLETE CBC W/AUTO DIFF WBC: CPT

## 2025-06-27 PROCEDURE — 82607 VITAMIN B-12: CPT

## 2025-06-27 PROCEDURE — 80048 BASIC METABOLIC PNL TOTAL CA: CPT

## 2025-07-01 RX ORDER — BUSPIRONE HYDROCHLORIDE 5 MG/1
5 TABLET ORAL 3 TIMES DAILY
Qty: 270 TABLET | Refills: 1 | Status: CANCELLED | OUTPATIENT
Start: 2025-07-01

## 2025-07-02 ENCOUNTER — PATIENT MESSAGE (OUTPATIENT)
Dept: INTERNAL MEDICINE CLINIC | Facility: CLINIC | Age: 60
End: 2025-07-02

## 2025-07-02 NOTE — TELEPHONE ENCOUNTER
Prescription sent 1/31/25 for the increased dose 1 tab TID, # 270 +1 refill.  Pharmacy has been filling 30-day at a time. Dispense history shows pharmacy filled # 90 on 6/27/25. [employer] Message sent to patient.    Outpatient Medication Detail   Disp Refills Start End    busPIRone 5 MG Oral Tab 270 tablet 1 1/31/2025 --    Sig - Route: Take 1 tablet (5 mg total) by mouth 3 (three) times daily. - Oral    Sent to pharmacy as: busPIRone HCl 5 MG Oral Tablet (Buspar)    E-Prescribing Status: Receipt confirmed by pharmacy (1/31/2025  1:44 PM CST)    Ph[facility]  [id number] [place] [facility] [address] [phone], [phone]

## 2025-07-07 RX ORDER — BUSPIRONE HYDROCHLORIDE 10 MG/1
10 TABLET ORAL 3 TIMES DAILY
Qty: 270 TABLET | Refills: 3 | Status: SHIPPED | OUTPATIENT
Start: 2025-07-07 | End: 2026-07-02

## 2025-07-07 NOTE — TELEPHONE ENCOUNTER
Please advise.  According to 06/27/25 appointment, \"Patient reports that she feels much better since we increased buspirone takes 5 mg in the morning and 10 before she goes home after work, Effexor  mg daily, states that she has very little left to feel almost perfect.  States that anxiety builds up by the end of the day.  She wakes up feeling well, morning.  She completed blood work today  States he takes levothyroxine every day even if she misses in the morning she takes later throughout the day.  Reports that she tries to keep physically active throughout the day.    Patient states that she is taking 10mg (2 tablets) in morning, midday and evening. Ok to send a new prescription for 10mg tablets?

## 2025-08-13 RX ORDER — NADOLOL 20 MG/1
20 TABLET ORAL DAILY
Qty: 90 TABLET | Refills: 3 | Status: CANCELLED | OUTPATIENT
Start: 2025-08-13

## 2025-08-18 RX ORDER — DOXEPIN HYDROCHLORIDE 10 MG/1
10 CAPSULE ORAL NIGHTLY
Qty: 90 CAPSULE | Refills: 0 | OUTPATIENT
Start: 2025-08-18

## 2025-08-18 RX ORDER — DOXEPIN HYDROCHLORIDE 10 MG/1
10 CAPSULE ORAL NIGHTLY
Qty: 30 CAPSULE | Refills: 5 | Status: SHIPPED | OUTPATIENT
Start: 2025-08-18

## 2025-08-21 RX ORDER — DOXEPIN HYDROCHLORIDE 10 MG/1
10 CAPSULE ORAL NIGHTLY
Qty: 90 CAPSULE | Refills: 1 | Status: SHIPPED | OUTPATIENT
Start: 2025-08-21

## 2025-08-21 RX ORDER — DOXEPIN HYDROCHLORIDE 10 MG/1
10 CAPSULE ORAL NIGHTLY
Qty: 90 CAPSULE | Refills: 0 | OUTPATIENT
Start: 2025-08-21

## (undated) DIAGNOSIS — Z12.11 COLON CANCER SCREENING: Primary | ICD-10-CM

## (undated) DEVICE — 1200CC GUARDIAN II: Brand: GUARDIAN

## (undated) DEVICE — ENDOSCOPY PACK - LOWER: Brand: MEDLINE INDUSTRIES, INC.

## (undated) DEVICE — FILTERLINE NASAL ADULT O2/CO2

## (undated) DEVICE — Device: Brand: DEFENDO AIR/WATER/SUCTION AND BIOPSY VALVE

## (undated) DEVICE — SNARE 9MM 230CM 2.4MM EXACTO

## (undated) DEVICE — 3M™ RED DOT™ MONITORING ELECTRODE WITH FOAM TAPE AND STICKY GEL, 50/BAG, 20/CASE, 72/PLT 2570: Brand: RED DOT™

## (undated) DEVICE — TRAP SPEC REMOVAL ETRAP 15CM

## (undated) NOTE — ED AVS SNAPSHOT
[facility] Emergency Department    [place]  [address] [address]    Phone:  [phone]    Fax:  Via Ashley Joseph   MRN: [id number]    Department:  [facility] Emergency Department   Date of Visit:  3/17/201 To Check ER Wait Times:  TEXT 'ERwait' to 57468      Click [email or web address]      Or call [phone]    If you have any problems with your follow-up, please call our  at [phone]    Si usted tiene algun problema con benitez sequimiento, por f I have read and understand the instructions given to me by my caregivers. 24-Hour Pharmacies        Pharmacy Address Phone Number   [address] ([address]) Kathryn Allen Dictated by: aGllito Osorio MD on 3/17/2017 at 21:47       Approved by: Gallito Osorio MD              Narrative:    PROCEDURE:  XR CHEST AP PORTABLE (CPT=71010)     TECHNIQUE:  AP chest radiograph was obtained. COMPARISON:  None.      INDICATIONS:

## (undated) NOTE — LETTER
6/28/2019              Adriana Vela        [address]        [address] [place] [address]         Patient may return to work without restrictions on 6/28/2019.     Sincerely,    MD LUPE Ovalles [facility], [address], [place] [address]

## (undated) NOTE — LETTER
3/11/2019              Adriana IBARRA Dane        [address] [place] [address]         Please excuse patient from work from a March 9, 2019 until March 13, 2019 due to asthmatic bronchitis.       Sincerely,    Corrine Wu MD  [facility]

## (undated) NOTE — LETTER
11/8/2017              Adriana Vela        [address]  , [address] [address] [facility]         Please excuse form work from 11-4-17  till 11-9-17 due to illness. She may return to work on 11-10-17.       Sincerely,    Tomi Pfeiffer MD  [facility]

## (undated) NOTE — LETTER
6/5/2019          To Whom It May Concern: Jenni Miller is currently under my medical care and may not return to work at this time. Please excuse Tamera Mendoza for 2 days. She may return to work on 06/07/2019. Activity is restricted as follows: light duty.

## (undated) NOTE — ED AVS SNAPSHOT
[facility] Emergency Department    [place]  [address] [address]    Phone:  [phone]    Fax:  Via Ashley Joseph   MRN: [id number]    Department:  [facility] Emergency Department   Date of Visit:  3/17/201 IF THERE IS ANY CHANGE OR WORSENING OF YOUR CONDITION, CALL YOUR PRIMARY CARE PHYSICIAN AT ONCE OR RETURN IMMEDIATELY TO THE EMERGENCY DEPARTMENT.     If you have been prescribed any medication(s), please fill your prescription right away and begin taking t

## (undated) NOTE — ED AVS SNAPSHOT
[facility] Emergency Department    [place]  [address] [address]    Phone:  [phone]    Fax:  Ramandeep Joseph   MRN: [id number]    Department:  [facility] Emergency Department   Date of Visit:  3/18/201 IF THERE IS ANY CHANGE OR WORSENING OF YOUR CONDITION, CALL YOUR PRIMARY CARE PHYSICIAN AT ONCE OR RETURN IMMEDIATELY TO THE EMERGENCY DEPARTMENT.     If you have been prescribed any medication(s), please fill your prescription right away and begin taking t

## (undated) NOTE — LETTER
6/6/2019          To Whom It May Concern: Tiny Roberts is currently under my medical care and may not return to work at this time. Please excuse [facility] for 2 days. She may return to work on 06/07/2019.   Activity is restricted as follows: light duty fro

## (undated) NOTE — ED AVS SNAPSHOT
[facility] Emergency Department    [place]  [address] [address]    Phone:  [phone]    Fax:  Ramandeep Joseph   MRN: [id number]    Department:  [facility] Emergency Department   Date of Visit:  3/18/201 self-assessment the day after your visit. You may also receive a call from our patient liason soon after your visit. Also, some patients receive a detailed feedback survey mailed to them a week after the visit.   If you receive this, we would really apprec [address] [phone] [address] [facility] [id number] [address] ([address]) [address] [place] [address]

## (undated) NOTE — LETTER
8/31/2021          To Whom It May Concern: Sanna Sanders is currently under my medical care and may not return to work pending covid-19 test results. If you require additional information please contact our office.       Sincerely,    SHAWN Morales

## (undated) NOTE — LETTER
9/2/2021      To Whom It May Concern: Jammie Romero is currently under my medical care and may return to work on Monday 9/6/2021 . If you require additional information please contact our office.       Sincerely,    SHAWN Simmons            Do

## (undated) NOTE — LETTER
17      Patient: Gaviota Farmer  : 1965 Visit date: 2017    Dear Vipin Hayward,      I examined your patient in consultation today. She has an infected cyst of the right medial cheek.   We unroofed the area and drained it, and have

## (undated) NOTE — LETTER
6/6/2019          To Whom It May Concern: Danielle Parkinson is currently under my medical care and may not return to work at this time. Please excuse Fritz To for 2 days. She may return to work on 06/07/2019.   Activity is restricted as follows: light duty fro

## (undated) NOTE — LETTER
8/14/2020              Adriana Vela        [address] [place] [address]         Dear Beth Alcaraz,    This letter is to inform you that our office has made several attempts to reach you by phone without success.   We were attempting to co

## (undated) NOTE — MR AVS SNAPSHOT
[facility]  [address] [place] [address], [address] [place] [address] Kayla               Thank you for choosing us for your health care visit with Jean Paz MD.  We are glad to serve you and happy to provide you with this summary of yo Imaging:  [facility] DAYDAY 2D+3D SCREENING BILAT (CPT=77067/08412)    Instructions:   To schedule a test at any [facility], call Central Scheduling at [phone], Monday through Friday between 7:30am to 6pm and on Saturday between 4